# Patient Record
Sex: MALE | Race: WHITE | NOT HISPANIC OR LATINO | Employment: OTHER | URBAN - NONMETROPOLITAN AREA
[De-identification: names, ages, dates, MRNs, and addresses within clinical notes are randomized per-mention and may not be internally consistent; named-entity substitution may affect disease eponyms.]

---

## 2023-06-05 NOTE — PROGRESS NOTES
Patient ID:   Buzz Suh is a 52 y.o. male with PMH remarkable for gout (one flare up in the past), tobacco dependence who presents to the office today for Establish Care.    HEALTH MAINTENANCE: Establish Care  Previous PCP: Dr Hunt  Smoking: Current Smoker - 1 ppd x30 yrs  -- quit ~6m ago for a few months but had zero ambition so he started back up.  Labs: 2022 -> DUE  Creat was 1.43, , ETOH 146 at that time.   PSA: DUE  #father h/o prostate cancer  Colonoscopy (45-75): Never had one -> will refer to general surgery for screening colonoscopy  Lung cancer (55-80 + 30 pack year + smoking/quit in last 15 years): 3/7/2023 CT cardiac scoring was 0. No nodules were noted.   EK2023  -- family has h/o MI's (mother, father and grandmother). Has 2 older brothers.   -- snores at night. Never had a sleep study. Always snored since age 11.   -- reports that when he has too much sugar, his vision becomes blurry  -- reports that he has allergies. Takes zyrtec and flonase usually.   -- urinates frequently, has edema in BLE x1 when he ws dx with gout, and c/o acid reflux on/off.   - murmur heard on exam today. Due to family history of CAD, advised to let us know and we can get further workup (echo, stress test).    SOCIAL HISTORY:  Social History     Tobacco Use    Smoking status: Every Day     Types: Cigarettes    Smokeless tobacco: Never   Substance Use Topics    Alcohol use: Yes    Drug use: Never     REVIEW OF SYSTEMS:  Review of Systems   Genitourinary:  Positive for frequency.   All other systems reviewed and are negative.  12 point review of systems negative unless stated above in HPI    ALLERGIES:  No Known Allergies     VITAL SIGNS:  Vitals:    23 1008   BP: 134/84   Pulse: 60   Resp: 18   SpO2: 95%     PHYSICAL EXAM:  Physical Exam  Vitals reviewed.   Constitutional:       General: He is not in acute distress.     Appearance: Normal appearance. He is not ill-appearing.   HENT:      Head:  Normocephalic and atraumatic.      Right Ear: Tympanic membrane and external ear normal.      Left Ear: Tympanic membrane and external ear normal.      Nose: Nose normal.      Mouth/Throat:      Mouth: Mucous membranes are moist.      Pharynx: Oropharynx is clear.   Eyes:      Conjunctiva/sclera: Conjunctivae normal.      Pupils: Pupils are equal, round, and reactive to light.   Cardiovascular:      Rate and Rhythm: Normal rate and regular rhythm.      Heart sounds: Murmur heard.   Pulmonary:      Effort: Pulmonary effort is normal. No respiratory distress.      Breath sounds: Normal breath sounds. No wheezing.   Abdominal:      General: There is no distension.      Palpations: Abdomen is soft. There is no mass.      Tenderness: There is no abdominal tenderness.   Musculoskeletal:         General: Normal range of motion.      Cervical back: Normal range of motion and neck supple.   Skin:     General: Skin is warm and dry.   Neurological:      General: No focal deficit present.      Mental Status: He is alert and oriented to person, place, and time.      Sensory: No sensory deficit.      Motor: No weakness.      Coordination: Coordination normal.      Gait: Gait normal.   Psychiatric:         Mood and Affect: Mood normal.         Behavior: Behavior normal.       MEDICATIONS:  Current Outpatient Medications on File Prior to Visit   Medication Sig Dispense Refill    fluticasone (Flonase) 50 mcg/actuation nasal spray Administer 1 spray into each nostril once daily. Shake gently. Before first use, prime pump. After use, clean tip and replace cap.       No current facility-administered medications on file prior to visit.      LABORATORY DATA:  Lab Results   Component Value Date    WBC 6.2 04/05/2022    HGB 13.7 04/05/2022    HCT 39.9 (L) 04/05/2022     04/05/2022    ALT 52 04/05/2022    AST 37 04/05/2022     04/05/2022    K 3.5 04/05/2022    CL 99 04/05/2022    CREATININE 1.43 (H) 04/05/2022    BUN 16  04/05/2022    CO2 26 04/05/2022    INR 1.0 04/05/2022     ASSESSMENT AND PLAN:  Assessment/Plan   Diagnoses and all orders for this visit:  Health care maintenance  -     CBC and Auto Differential; Future  -     Comprehensive Metabolic Panel; Future  -     TSH with reflex to Free T4 if abnormal; Future  -     Vitamin D, Total; Future  -     Vitamin B12; Future  Lipid screening  -     Lipid Panel; Future  Screening PSA (prostate specific antigen)  -     Prostate Spec.Ag,Screen; Future  Cardiac murmur  Comments:  - let us know if you develop SOB, NOYOLA, edema, etc and we can order an echocardiogram  Encounter for screening colonoscopy  -     Referral to General Surgery; Future  Polyuria  -     Hemoglobin A1c; Future    --------------------  Written by Pattie Lira RN, acting as a scribe for Dr. Andrade. This note accurately reflects the work and decisions made by Dr. Andrade.     I, Dr. Andrade, attest all medical record entries made by the scribe were under my direction and were personally dictated by me. I have reviewed the chart and agree that the record accurately reflects my performance of the history, physical exam, and assessment and plan.

## 2023-06-06 ENCOUNTER — OFFICE VISIT (OUTPATIENT)
Dept: PRIMARY CARE | Facility: CLINIC | Age: 53
End: 2023-06-06
Payer: COMMERCIAL

## 2023-06-06 VITALS
RESPIRATION RATE: 18 BRPM | DIASTOLIC BLOOD PRESSURE: 84 MMHG | SYSTOLIC BLOOD PRESSURE: 134 MMHG | HEIGHT: 75 IN | HEART RATE: 60 BPM | WEIGHT: 265 LBS | OXYGEN SATURATION: 95 % | BODY MASS INDEX: 32.95 KG/M2

## 2023-06-06 DIAGNOSIS — Z13.220 LIPID SCREENING: ICD-10-CM

## 2023-06-06 DIAGNOSIS — Z12.11 ENCOUNTER FOR SCREENING COLONOSCOPY: ICD-10-CM

## 2023-06-06 DIAGNOSIS — Z00.00 HEALTH CARE MAINTENANCE: ICD-10-CM

## 2023-06-06 DIAGNOSIS — R35.89 POLYURIA: ICD-10-CM

## 2023-06-06 DIAGNOSIS — R01.1 CARDIAC MURMUR: ICD-10-CM

## 2023-06-06 DIAGNOSIS — Z12.5 SCREENING PSA (PROSTATE SPECIFIC ANTIGEN): ICD-10-CM

## 2023-06-06 PROBLEM — M77.8 TRICEPS TENDINITIS: Status: RESOLVED | Noted: 2023-06-06 | Resolved: 2023-06-06

## 2023-06-06 PROBLEM — S52.123A RADIAL HEAD FRACTURE: Status: RESOLVED | Noted: 2023-06-06 | Resolved: 2023-06-06

## 2023-06-06 PROBLEM — J42 ACUTE EXACERBATION OF CHRONIC BRONCHITIS (MULTI): Status: RESOLVED | Noted: 2023-06-06 | Resolved: 2023-06-06

## 2023-06-06 PROBLEM — M19.071 ARTHRITIS OF RIGHT ANKLE: Status: RESOLVED | Noted: 2023-06-06 | Resolved: 2023-06-06

## 2023-06-06 PROBLEM — M70.22 OLECRANON BURSITIS OF LEFT ELBOW: Status: RESOLVED | Noted: 2023-06-06 | Resolved: 2023-06-06

## 2023-06-06 PROBLEM — T14.8XXA PUNCTURE WOUND: Status: RESOLVED | Noted: 2023-06-06 | Resolved: 2023-06-06

## 2023-06-06 PROBLEM — S46.311A STRAIN OF RIGHT TRICEPS: Status: RESOLVED | Noted: 2023-06-06 | Resolved: 2023-06-06

## 2023-06-06 PROBLEM — J30.2 SEASONAL ALLERGIC RHINITIS: Status: ACTIVE | Noted: 2023-06-06

## 2023-06-06 PROBLEM — M25.571 RIGHT ANKLE PAIN: Status: RESOLVED | Noted: 2023-06-06 | Resolved: 2023-06-06

## 2023-06-06 PROBLEM — J20.9 ACUTE EXACERBATION OF CHRONIC BRONCHITIS (MULTI): Status: RESOLVED | Noted: 2023-06-06 | Resolved: 2023-06-06

## 2023-06-06 PROBLEM — M10.9 ACUTE GOUT OF RIGHT ANKLE: Status: RESOLVED | Noted: 2023-06-06 | Resolved: 2023-06-06

## 2023-06-06 PROBLEM — T81.9XXA POST SURGICAL COMPLICATION: Status: RESOLVED | Noted: 2023-06-06 | Resolved: 2023-06-06

## 2023-06-06 PROBLEM — M25.529 ELBOW PAIN: Status: RESOLVED | Noted: 2023-06-06 | Resolved: 2023-06-06

## 2023-06-06 PROBLEM — S99.929A FOOT INJURY: Status: RESOLVED | Noted: 2023-06-06 | Resolved: 2023-06-06

## 2023-06-06 PROBLEM — S99.919A ANKLE INJURY: Status: RESOLVED | Noted: 2023-06-06 | Resolved: 2023-06-06

## 2023-06-06 PROBLEM — J20.9 ACUTE BRONCHITIS: Status: RESOLVED | Noted: 2023-06-06 | Resolved: 2023-06-06

## 2023-06-06 PROBLEM — J01.00 ACUTE MAXILLARY SINUSITIS: Status: RESOLVED | Noted: 2023-06-06 | Resolved: 2023-06-06

## 2023-06-06 PROBLEM — R42 LIGHTHEADEDNESS: Status: RESOLVED | Noted: 2023-06-06 | Resolved: 2023-06-06

## 2023-06-06 PROCEDURE — 99396 PREV VISIT EST AGE 40-64: CPT | Performed by: INTERNAL MEDICINE

## 2023-06-06 RX ORDER — FLUTICASONE PROPIONATE 50 MCG
1 SPRAY, SUSPENSION (ML) NASAL DAILY
COMMUNITY

## 2023-06-06 ASSESSMENT — PATIENT HEALTH QUESTIONNAIRE - PHQ9
1. LITTLE INTEREST OR PLEASURE IN DOING THINGS: NOT AT ALL
2. FEELING DOWN, DEPRESSED OR HOPELESS: NOT AT ALL
SUM OF ALL RESPONSES TO PHQ9 QUESTIONS 1 AND 2: 0

## 2023-06-06 ASSESSMENT — ENCOUNTER SYMPTOMS: FREQUENCY: 1

## 2023-06-06 ASSESSMENT — PAIN SCALES - GENERAL: PAINLEVEL: 0-NO PAIN

## 2023-06-13 ENCOUNTER — LAB (OUTPATIENT)
Dept: LAB | Facility: LAB | Age: 53
End: 2023-06-13
Payer: COMMERCIAL

## 2023-06-13 DIAGNOSIS — Z12.5 SCREENING PSA (PROSTATE SPECIFIC ANTIGEN): ICD-10-CM

## 2023-06-13 DIAGNOSIS — R35.89 POLYURIA: ICD-10-CM

## 2023-06-13 DIAGNOSIS — Z13.220 LIPID SCREENING: ICD-10-CM

## 2023-06-13 DIAGNOSIS — Z00.00 HEALTH CARE MAINTENANCE: ICD-10-CM

## 2023-06-13 LAB
ALANINE AMINOTRANSFERASE (SGPT) (U/L) IN SER/PLAS: 25 U/L (ref 10–52)
ALBUMIN (G/DL) IN SER/PLAS: 4.5 G/DL (ref 3.4–5)
ALKALINE PHOSPHATASE (U/L) IN SER/PLAS: 67 U/L (ref 33–120)
ANION GAP IN SER/PLAS: 12 MMOL/L (ref 10–20)
ASPARTATE AMINOTRANSFERASE (SGOT) (U/L) IN SER/PLAS: 19 U/L (ref 9–39)
BASOPHILS (10*3/UL) IN BLOOD BY AUTOMATED COUNT: 0.05 X10E9/L (ref 0–0.1)
BASOPHILS/100 LEUKOCYTES IN BLOOD BY AUTOMATED COUNT: 0.9 % (ref 0–2)
BILIRUBIN TOTAL (MG/DL) IN SER/PLAS: 0.5 MG/DL (ref 0–1.2)
CALCIDIOL (25 OH VITAMIN D3) (NG/ML) IN SER/PLAS: 25 NG/ML
CALCIUM (MG/DL) IN SER/PLAS: 9.6 MG/DL (ref 8.6–10.3)
CARBON DIOXIDE, TOTAL (MMOL/L) IN SER/PLAS: 25 MMOL/L (ref 21–32)
CHLORIDE (MMOL/L) IN SER/PLAS: 102 MMOL/L (ref 98–107)
CHOLESTEROL (MG/DL) IN SER/PLAS: 213 MG/DL (ref 0–199)
CHOLESTEROL IN HDL (MG/DL) IN SER/PLAS: 34.1 MG/DL
CHOLESTEROL/HDL RATIO: 6.2
COBALAMIN (VITAMIN B12) (PG/ML) IN SER/PLAS: 368 PG/ML (ref 211–911)
CREATININE (MG/DL) IN SER/PLAS: 1.2 MG/DL (ref 0.5–1.3)
EOSINOPHILS (10*3/UL) IN BLOOD BY AUTOMATED COUNT: 0.13 X10E9/L (ref 0–0.7)
EOSINOPHILS/100 LEUKOCYTES IN BLOOD BY AUTOMATED COUNT: 2.4 % (ref 0–6)
ERYTHROCYTE DISTRIBUTION WIDTH (RATIO) BY AUTOMATED COUNT: 12.7 % (ref 11.5–14.5)
ERYTHROCYTE MEAN CORPUSCULAR HEMOGLOBIN CONCENTRATION (G/DL) BY AUTOMATED: 33 G/DL (ref 32–36)
ERYTHROCYTE MEAN CORPUSCULAR VOLUME (FL) BY AUTOMATED COUNT: 96 FL (ref 80–100)
ERYTHROCYTES (10*6/UL) IN BLOOD BY AUTOMATED COUNT: 4.75 X10E12/L (ref 4.5–5.9)
ESTIMATED AVERAGE GLUCOSE FOR HBA1C: 126 MG/DL
GFR MALE: 73 ML/MIN/1.73M2
GLUCOSE (MG/DL) IN SER/PLAS: 108 MG/DL (ref 74–99)
HEMATOCRIT (%) IN BLOOD BY AUTOMATED COUNT: 45.5 % (ref 41–52)
HEMOGLOBIN (G/DL) IN BLOOD: 15 G/DL (ref 13.5–17.5)
HEMOGLOBIN A1C/HEMOGLOBIN TOTAL IN BLOOD: 6 %
IMMATURE GRANULOCYTES/100 LEUKOCYTES IN BLOOD BY AUTOMATED COUNT: 0.7 % (ref 0–0.9)
LDL: 121 MG/DL (ref 0–99)
LEUKOCYTES (10*3/UL) IN BLOOD BY AUTOMATED COUNT: 5.5 X10E9/L (ref 4.4–11.3)
LYMPHOCYTES (10*3/UL) IN BLOOD BY AUTOMATED COUNT: 1.7 X10E9/L (ref 1.2–4.8)
LYMPHOCYTES/100 LEUKOCYTES IN BLOOD BY AUTOMATED COUNT: 31 % (ref 13–44)
MONOCYTES (10*3/UL) IN BLOOD BY AUTOMATED COUNT: 0.41 X10E9/L (ref 0.1–1)
MONOCYTES/100 LEUKOCYTES IN BLOOD BY AUTOMATED COUNT: 7.5 % (ref 2–10)
NEUTROPHILS (10*3/UL) IN BLOOD BY AUTOMATED COUNT: 3.15 X10E9/L (ref 1.2–7.7)
NEUTROPHILS/100 LEUKOCYTES IN BLOOD BY AUTOMATED COUNT: 57.5 % (ref 40–80)
NON HDL CHOLESTEROL: 179 MG/DL
PLATELETS (10*3/UL) IN BLOOD AUTOMATED COUNT: 215 X10E9/L (ref 150–450)
POTASSIUM (MMOL/L) IN SER/PLAS: 4.1 MMOL/L (ref 3.5–5.3)
PROSTATE SPECIFIC ANTIGEN,SCREEN: 0.47 NG/ML (ref 0–4)
PROTEIN TOTAL: 6.6 G/DL (ref 6.4–8.2)
SODIUM (MMOL/L) IN SER/PLAS: 135 MMOL/L (ref 136–145)
THYROTROPIN (MIU/L) IN SER/PLAS BY DETECTION LIMIT <= 0.05 MIU/L: 6.12 MIU/L (ref 0.44–3.98)
THYROXINE (T4) FREE (NG/DL) IN SER/PLAS: 0.66 NG/DL (ref 0.61–1.12)
TRIGLYCERIDE (MG/DL) IN SER/PLAS: 291 MG/DL (ref 0–149)
UREA NITROGEN (MG/DL) IN SER/PLAS: 23 MG/DL (ref 6–23)
VLDL: 58 MG/DL (ref 0–40)

## 2023-06-13 PROCEDURE — 82306 VITAMIN D 25 HYDROXY: CPT

## 2023-06-13 PROCEDURE — 80053 COMPREHEN METABOLIC PANEL: CPT

## 2023-06-13 PROCEDURE — 84153 ASSAY OF PSA TOTAL: CPT

## 2023-06-13 PROCEDURE — 80061 LIPID PANEL: CPT

## 2023-06-13 PROCEDURE — 84439 ASSAY OF FREE THYROXINE: CPT

## 2023-06-13 PROCEDURE — 84443 ASSAY THYROID STIM HORMONE: CPT

## 2023-06-13 PROCEDURE — 83036 HEMOGLOBIN GLYCOSYLATED A1C: CPT

## 2023-06-13 PROCEDURE — 82607 VITAMIN B-12: CPT

## 2023-06-13 PROCEDURE — 36415 COLL VENOUS BLD VENIPUNCTURE: CPT

## 2023-06-13 PROCEDURE — 85025 COMPLETE CBC W/AUTO DIFF WBC: CPT

## 2023-06-19 DIAGNOSIS — E78.00 HYPERCHOLESTEREMIA: ICD-10-CM

## 2023-06-19 DIAGNOSIS — E03.9 HYPOTHYROIDISM, UNSPECIFIED TYPE: ICD-10-CM

## 2023-06-19 RX ORDER — ROSUVASTATIN CALCIUM 10 MG/1
10 TABLET, COATED ORAL DAILY
Qty: 90 TABLET | Refills: 0 | Status: SHIPPED | OUTPATIENT
Start: 2023-06-19 | End: 2023-09-15

## 2023-06-19 RX ORDER — LEVOTHYROXINE SODIUM 25 UG/1
25 TABLET ORAL DAILY
Qty: 90 TABLET | Refills: 0 | Status: SHIPPED | OUTPATIENT
Start: 2023-06-19 | End: 2023-09-15

## 2023-09-15 DIAGNOSIS — E03.9 HYPOTHYROIDISM, UNSPECIFIED TYPE: ICD-10-CM

## 2023-09-15 DIAGNOSIS — E78.00 HYPERCHOLESTEREMIA: ICD-10-CM

## 2023-09-15 RX ORDER — LEVOTHYROXINE SODIUM 25 UG/1
25 TABLET ORAL DAILY
Qty: 90 TABLET | Refills: 0 | Status: SHIPPED | OUTPATIENT
Start: 2023-09-15

## 2023-09-15 RX ORDER — ROSUVASTATIN CALCIUM 10 MG/1
10 TABLET, COATED ORAL DAILY
Qty: 90 TABLET | Refills: 0 | Status: SHIPPED | OUTPATIENT
Start: 2023-09-15

## 2023-09-22 ENCOUNTER — APPOINTMENT (OUTPATIENT)
Dept: PRIMARY CARE | Facility: CLINIC | Age: 53
End: 2023-09-22
Payer: COMMERCIAL

## 2023-11-03 ENCOUNTER — APPOINTMENT (OUTPATIENT)
Dept: PRIMARY CARE | Facility: CLINIC | Age: 53
End: 2023-11-03
Payer: COMMERCIAL

## 2023-11-22 ENCOUNTER — CLINICAL SUPPORT (OUTPATIENT)
Dept: PREADMISSION TESTING | Facility: HOSPITAL | Age: 53
End: 2023-11-22
Payer: COMMERCIAL

## 2023-11-22 ASSESSMENT — DUKE ACTIVITY SCORE INDEX (DASI)
CAN YOU CLIMB A FLIGHT OF STAIRS OR WALK UP A HILL: YES
CAN YOU TAKE CARE OF YOURSELF (EAT, DRESS, BATHE, OR USE TOILET): YES
CAN YOU DO YARD WORK LIKE RAKING LEAVES, WEEDING OR PUSHING A MOWER: YES
CAN YOU DO MODERATE WORK AROUND THE HOUSE LIKE VACUUMING, SWEEPING FLOORS OR CARRYING GROCERIES: YES
CAN YOU WALK INDOORS, SUCH AS AROUND YOUR HOUSE: YES
CAN YOU PARTICIPATE IN MODERATE RECREATIONAL ACTIVITIES LIKE GOLF, BOWLING, DANCING, DOUBLES TENNIS OR THROWING A BASEBALL OR FOOTBALL: YES
CAN YOU DO LIGHT WORK AROUND THE HOUSE LIKE DUSTING OR WASHING DISHES: YES
CAN YOU WALK A BLOCK OR TWO ON LEVEL GROUND: YES
CAN YOU DO HEAVY WORK AROUND THE HOUSE LIKE SCRUBBING FLOORS OR LIFTING AND MOVING HEAVY FURNITURE: YES
CAN YOU RUN A SHORT DISTANCE: YES

## 2023-11-22 NOTE — PREPROCEDURE INSTRUCTIONS
Current Medications   Medication Instructions    fluticasone (Flonase) 50 mcg/actuation nasal spray You may take the am of the procedure should you need it.      levothyroxine (Synthroid, Levoxyl) 25 mcg tablet Take the morning of with a small sip of water.      rosuvastatin (Crestor) 10 mg tablet Take the night before like you normally do with a small sip of water.      Call outpatient surgery on Wednesday 11/29 between 1-3 pm to get your arrival time.   [phone]  You  may eat 1 hard boiled or poached egg and a piece of dry toast by 8 am on Wed 11/29.  After you are switching to clear liquids.    Clear liquids are the following:  Chicken broth- nothing in it noodle wise or rice wise (plain)  Popsicles: plain no fruit or ice cream.   NO red blue or purple.   Jello: plain no fruit or whip topping  NO red blue or purple.   Propel, gatorade, powerade NO red blue or purple  Coffee or tea  Has to be black.  NO DAIRY NON DAIRY OAT , ALMOND ETC.    Water or soda     No candy, gum or mints once you switch to clear liquids.       You can have clear liquids up to 3 hrs prior to your procedure.  NO DAIRY, NO CREAMER, NO NON DAIRY OR ALMOND, OAT, COCONUT ETC.    Please refrain from smoking THC, cigarettes or vaping prior to your procedure at least 24 hrs.     When  you arrive park in the back ER parking lot.  Come through the ER lobby and take the first elevator to second floor.   Check in on the outpatient surgery window.    Leave all valuables at home and remove all piercing's.      Do mouth wash and bath for infection control if applicable.      NO driving for 24 hrs if you have had anesthesia or sedation.   You will also need a  if you are receiving sedation.       Bring glasses so you can read what you are signing.       PREP SUTAB     Start your pills at 6 pm on Wed 11/29.       STEP 1 Open 1 bottle of 12 tablets.    STEP 2 Fill the provided container with 16 ounces of  water (up to the fill line). Swallow 1  tablet every 1 to 2  minutes. You should finish the 12 tablets and the  entire 16 ounces of water within 20 minutes.  Drink the additional two 16 ounces of water    STEP 3 Approximately 1 hour after the last tablet  is ingested, fill the provided container again with 16  ounces of water (up to the fill line), and drink the entire  amount over 30 minutes.    STEP 4 Approximately 30 minutes after finishing the  second container of water, fill the provided container with  16 ounces of water (up to the fill line), and drink the entire  amount over 30 minutes.    Try and go to sleep.  Then next morning at 3:00 am. On 11/30/23  Set alarm and repeat the directions with second round of 12 pills.

## 2023-11-30 ENCOUNTER — ANESTHESIA EVENT (OUTPATIENT)
Dept: GASTROENTEROLOGY | Facility: HOSPITAL | Age: 53
End: 2023-11-30
Payer: COMMERCIAL

## 2023-11-30 ENCOUNTER — ANESTHESIA (OUTPATIENT)
Dept: GASTROENTEROLOGY | Facility: HOSPITAL | Age: 53
End: 2023-11-30
Payer: COMMERCIAL

## 2023-11-30 ENCOUNTER — HOSPITAL ENCOUNTER (OUTPATIENT)
Dept: GASTROENTEROLOGY | Facility: HOSPITAL | Age: 53
Setting detail: OUTPATIENT SURGERY
Discharge: HOME | End: 2023-11-30
Payer: COMMERCIAL

## 2023-11-30 VITALS
WEIGHT: 271.17 LBS | BODY MASS INDEX: 34.8 KG/M2 | TEMPERATURE: 97 F | DIASTOLIC BLOOD PRESSURE: 87 MMHG | RESPIRATION RATE: 18 BRPM | HEART RATE: 64 BPM | SYSTOLIC BLOOD PRESSURE: 130 MMHG | OXYGEN SATURATION: 98 % | HEIGHT: 74 IN

## 2023-11-30 DIAGNOSIS — G47.33 OSA (OBSTRUCTIVE SLEEP APNEA): ICD-10-CM

## 2023-11-30 DIAGNOSIS — Z12.11 ENCOUNTER FOR SCREENING COLONOSCOPY: ICD-10-CM

## 2023-11-30 DIAGNOSIS — Z12.11 ENCOUNTER FOR SCREENING FOR MALIGNANT NEOPLASM OF COLON: ICD-10-CM

## 2023-11-30 PROBLEM — F17.200 CURRENT SMOKER: Status: ACTIVE | Noted: 2023-11-30

## 2023-11-30 PROBLEM — E03.9 HYPOTHYROIDISM: Status: ACTIVE | Noted: 2023-11-30

## 2023-11-30 PROBLEM — J30.9 ALLERGIC RHINITIS: Status: ACTIVE | Noted: 2023-11-30

## 2023-11-30 PROBLEM — E78.5 HYPERLIPIDEMIA: Status: ACTIVE | Noted: 2023-11-30

## 2023-11-30 PROCEDURE — 2500000004 HC RX 250 GENERAL PHARMACY W/ HCPCS (ALT 636 FOR OP/ED)

## 2023-11-30 PROCEDURE — 88305 TISSUE EXAM BY PATHOLOGIST: CPT | Mod: TC,SUR | Performed by: SURGERY

## 2023-11-30 PROCEDURE — 3700000001 HC GENERAL ANESTHESIA TIME - INITIAL BASE CHARGE

## 2023-11-30 PROCEDURE — 7100000010 HC PHASE TWO TIME - EACH INCREMENTAL 1 MINUTE

## 2023-11-30 PROCEDURE — 7100000009 HC PHASE TWO TIME - INITIAL BASE CHARGE

## 2023-11-30 PROCEDURE — 88305 TISSUE EXAM BY PATHOLOGIST: CPT | Performed by: PATHOLOGY

## 2023-11-30 PROCEDURE — 3700000002 HC GENERAL ANESTHESIA TIME - EACH INCREMENTAL 1 MINUTE

## 2023-11-30 PROCEDURE — 45385 COLONOSCOPY W/LESION REMOVAL: CPT | Performed by: SURGERY

## 2023-11-30 PROCEDURE — 2500000005 HC RX 250 GENERAL PHARMACY W/O HCPCS

## 2023-11-30 RX ORDER — PROPOFOL 10 MG/ML
INJECTION, EMULSION INTRAVENOUS AS NEEDED
Status: DISCONTINUED | OUTPATIENT
Start: 2023-11-30 | End: 2023-11-30

## 2023-11-30 RX ORDER — LIDOCAINE HYDROCHLORIDE 20 MG/ML
INJECTION, SOLUTION INFILTRATION; PERINEURAL AS NEEDED
Status: DISCONTINUED | OUTPATIENT
Start: 2023-11-30 | End: 2023-11-30

## 2023-11-30 RX ORDER — SODIUM CHLORIDE, SODIUM LACTATE, POTASSIUM CHLORIDE, CALCIUM CHLORIDE 600; 310; 30; 20 MG/100ML; MG/100ML; MG/100ML; MG/100ML
30 INJECTION, SOLUTION INTRAVENOUS CONTINUOUS
Status: DISCONTINUED | OUTPATIENT
Start: 2023-11-30 | End: 2023-12-01 | Stop reason: HOSPADM

## 2023-11-30 RX ADMIN — LIDOCAINE HYDROCHLORIDE 40 MG: 20 INJECTION, SOLUTION INFILTRATION; PERINEURAL at 12:23

## 2023-11-30 RX ADMIN — PROPOFOL 25 MG: 10 INJECTION, EMULSION INTRAVENOUS at 12:25

## 2023-11-30 RX ADMIN — PROPOFOL 25 MG: 10 INJECTION, EMULSION INTRAVENOUS at 12:36

## 2023-11-30 RX ADMIN — PROPOFOL 25 MG: 10 INJECTION, EMULSION INTRAVENOUS at 12:32

## 2023-11-30 RX ADMIN — PROPOFOL 30 MG: 10 INJECTION, EMULSION INTRAVENOUS at 12:26

## 2023-11-30 RX ADMIN — SODIUM CHLORIDE, POTASSIUM CHLORIDE, SODIUM LACTATE AND CALCIUM CHLORIDE 30 ML/HR: 600; 310; 30; 20 INJECTION, SOLUTION INTRAVENOUS at 11:07

## 2023-11-30 RX ADMIN — PROPOFOL 25 MG: 10 INJECTION, EMULSION INTRAVENOUS at 12:34

## 2023-11-30 RX ADMIN — PROPOFOL 25 MG: 10 INJECTION, EMULSION INTRAVENOUS at 12:28

## 2023-11-30 RX ADMIN — PROPOFOL 25 MG: 10 INJECTION, EMULSION INTRAVENOUS at 12:30

## 2023-11-30 RX ADMIN — PROPOFOL 120 MG: 10 INJECTION, EMULSION INTRAVENOUS at 12:23

## 2023-11-30 SDOH — HEALTH STABILITY: MENTAL HEALTH: CURRENT SMOKER: 0

## 2023-11-30 ASSESSMENT — PAIN - FUNCTIONAL ASSESSMENT
PAIN_FUNCTIONAL_ASSESSMENT: 0-10

## 2023-11-30 ASSESSMENT — COLUMBIA-SUICIDE SEVERITY RATING SCALE - C-SSRS
1. IN THE PAST MONTH, HAVE YOU WISHED YOU WERE DEAD OR WISHED YOU COULD GO TO SLEEP AND NOT WAKE UP?: NO
2. HAVE YOU ACTUALLY HAD ANY THOUGHTS OF KILLING YOURSELF?: NO
6. HAVE YOU EVER DONE ANYTHING, STARTED TO DO ANYTHING, OR PREPARED TO DO ANYTHING TO END YOUR LIFE?: NO

## 2023-11-30 ASSESSMENT — PAIN SCALES - GENERAL
PAINLEVEL_OUTOF10: 0 - NO PAIN
PAIN_LEVEL: 0
PAINLEVEL_OUTOF10: 0 - NO PAIN

## 2023-11-30 NOTE — POST-PROCEDURE NOTE
1253: Pt tolerating PO fluids. Wife at bedside Dr. Funk in to speak to pt and spouse.  1255: Discharge instructions reviewed. They deny needs or questions. Physical assessment unchanged.

## 2023-11-30 NOTE — ANESTHESIA PREPROCEDURE EVALUATION
Patient: Buzz Suh    Procedure Information       Date/Time: 11/30/23 1145    Scheduled providers: Brendon Funk MD    Procedures:       AMB REFERRAL TO GENERAL SURGERY      COLONOSCOPY    Location: [facility]          Vitals:    11/30/23 1101   BP: 132/78   Pulse: 59   Resp: 17   Temp: 36.4 °C (97.5 °F)   SpO2: 96%       Past Surgical History:   Procedure Laterality Date    ELBOW SURGERY  07/30/2018    Elbow Surgery    KNEE SURGERY  07/30/2018    Knee Surgery    OTHER SURGICAL HISTORY  07/30/2018    Elbow Arthroplasty Of Radial Head     Past Medical History:   Diagnosis Date    Acute bronchitis 06/06/2023    Acute gout of right ankle 06/06/2023    Acute maxillary sinusitis 06/06/2023    Ankle injury 06/06/2023    Arthritis of right ankle 06/06/2023    Elbow pain 06/06/2023    Foot injury 06/06/2023    Hypothyroidism     Olecranon bursitis of left elbow 06/06/2023    Post surgical complication 06/06/2023    Puncture wound 06/06/2023    Radial head fracture 06/06/2023    Right ankle pain 06/06/2023    Strain of right triceps 06/06/2023    Triceps tendinitis 06/06/2023       Current Outpatient Medications:     fluticasone (Flonase) 50 mcg/actuation nasal spray, Administer 1 spray into each nostril once daily. Shake gently. Before first use, prime pump. After use, clean tip and replace cap., Disp: , Rfl:     levothyroxine (Synthroid, Levoxyl) 25 mcg tablet, TAKE 1 TABLET (25 MCG) BY MOUTH DAILY, Disp: 90 tablet, Rfl: 0    rosuvastatin (Crestor) 10 mg tablet, TAKE 1 TABLET BY MOUTH EVERY DAY, Disp: 90 tablet, Rfl: 0    Current Facility-Administered Medications:     lactated Ringer's infusion, 30 mL/hr, intravenous, Continuous, Lety Urbano PA-C  Prior to Admission medications    Medication Sig Start Date End Date Taking? Authorizing Provider   fluticasone (Flonase) 50 mcg/actuation nasal spray Administer 1 spray into each nostril once daily. Shake gently. Before first use, prime pump. After use,  clean tip and replace cap.   Yes Historical Provider, MD   levothyroxine (Synthroid, Levoxyl) 25 mcg tablet TAKE 1 TABLET (25 MCG) BY MOUTH DAILY 9/15/23  Yes Adrian Galdamez MD   rosuvastatin (Crestor) 10 mg tablet TAKE 1 TABLET BY MOUTH EVERY DAY 9/15/23  Yes Adrian Galdamez MD     No Known Allergies  Social History     Tobacco Use    Smoking status: Every Day     Packs/day: .25     Types: Cigarettes    Smokeless tobacco: Never   Substance Use Topics    Alcohol use: Yes         Chemistry    Lab Results   Component Value Date/Time     (L) 06/13/2023 0757    K 4.1 06/13/2023 0757     06/13/2023 0757    CO2 25 06/13/2023 0757    BUN 23 06/13/2023 0757    CREATININE 1.20 06/13/2023 0757    Lab Results   Component Value Date/Time    CALCIUM 9.6 06/13/2023 0757    ALKPHOS 67 06/13/2023 0757    AST 19 06/13/2023 0757    ALT 25 06/13/2023 0757    BILITOT 0.5 06/13/2023 0757          Lab Results   Component Value Date/Time    WBC 5.5 06/13/2023 0757    HGB 15.0 06/13/2023 0757    HCT 45.5 06/13/2023 0757     06/13/2023 0757     Lab Results   Component Value Date/Time    PROTIME 11.0 04/05/2022 2042    INR 1.0 04/05/2022 2042     No results found for this or any previous visit (from the past 4464 hour(s)).  No results found for this or any previous visit from the past 1095 days.      Relevant Problems   Anesthesia (within normal limits)      Cardiovascular   (+) Cardiac murmur   (+) Hyperlipidemia      Endocrine   (+) Hypothyroidism      Tobacco   (+) Current smoker       Clinical information reviewed:   Tobacco  Allergies  Meds   Med Hx  Surg Hx   Fam Hx  Soc Hx        NPO Detail:  NPO/Void Status  Carbonhydrate Drink Given Prior to Surgery? : N  Date of Last Liquid: 11/30/23  Time of Last Liquid: 0500  Date of Last Solid: 11/29/23  Time of Last Solid: 0800  Last Intake Type: Clear fluids         Physical Exam    Airway  Mallampati: III  TM distance: >3 FB  Neck ROM: full     Cardiovascular -  normal exam     Dental - normal exam     Pulmonary - normal exam     Abdominal - normal exam             Anesthesia Plan    ASA 2     MAC     The patient is not a current smoker.  Patient was previously instructed to abstain from smoking on day of procedure.  Patient did not smoke on day of procedure.    intravenous induction   Anesthetic plan and risks discussed with patient.  Use of blood products discussed with patient who consented to blood products.    Plan discussed with CRNA.

## 2023-11-30 NOTE — ANESTHESIA POSTPROCEDURE EVALUATION
Patient: Buzz Suh    Procedure Summary       Date: 11/30/23 Room / Location: [facility]    Anesthesia Start: 1216 Anesthesia Stop: 1246    Procedures:       AMB REFERRAL TO GENERAL SURGERY      COLONOSCOPY Diagnosis:       Encounter for screening colonoscopy      Encounter for screening for malignant neoplasm of colon    Scheduled Providers: Brendon Funk MD Responsible Provider: CAROLINE West    Anesthesia Type: MAC ASA Status: 2            Anesthesia Type: MAC    Vitals Value Taken Time   /75 11/30/23 1242   Temp 36.1 °C (97 °F) 11/30/23 1242   Pulse 65 11/30/23 1242   Resp 18 11/30/23 1242   SpO2 97 % 11/30/23 1242       Anesthesia Post Evaluation    Patient location during evaluation: PACU  Patient participation: complete - patient participated  Level of consciousness: awake and alert  Pain score: 0  Pain management: adequate  Airway patency: patent  Cardiovascular status: acceptable  Respiratory status: acceptable and room air  Hydration status: acceptable  Postoperative Nausea and Vomiting: none        There were no known notable events for this encounter.

## 2023-11-30 NOTE — H&P
History Of Present Illness  Buzz Suh is a 53 y.o. male presenting for low risk colonoscopy         Past Medical History  Past Medical History:   Diagnosis Date    Acute bronchitis 06/06/2023    Acute gout of right ankle 06/06/2023    Acute maxillary sinusitis 06/06/2023    Ankle injury 06/06/2023    Arthritis of right ankle 06/06/2023    Elbow pain 06/06/2023    Foot injury 06/06/2023    Hypothyroidism     Olecranon bursitis of left elbow 06/06/2023    Post surgical complication 06/06/2023    Puncture wound 06/06/2023    Radial head fracture 06/06/2023    Right ankle pain 06/06/2023    Strain of right triceps 06/06/2023    Triceps tendinitis 06/06/2023       Surgical History  Past Surgical History:   Procedure Laterality Date    ELBOW SURGERY  07/30/2018    Elbow Surgery    KNEE SURGERY  07/30/2018    Knee Surgery    OTHER SURGICAL HISTORY  07/30/2018    Elbow Arthroplasty Of Radial Head        Social History  He reports that he has been smoking cigarettes. He has been smoking an average of .25 packs per day. He has never used smokeless tobacco. He reports current alcohol use. He reports that he does not use drugs.    Family History  Family History   Problem Relation Name Age of Onset    Heart attack Mother      Heart attack Father      Heart attack Paternal Grandmother          Allergies  Patient has no known allergies.    Review of Systems   All other systems reviewed and are negative.       Physical Exam  Vitals reviewed.   Constitutional:       Appearance: Normal appearance.   HENT:      Head: Normocephalic.   Cardiovascular:      Rate and Rhythm: Normal rate and regular rhythm.      Heart sounds: Normal heart sounds.   Pulmonary:      Effort: Pulmonary effort is normal.      Breath sounds: Normal breath sounds.   Abdominal:      General: Abdomen is flat. There is no distension.      Palpations: Abdomen is soft. There is no mass.      Tenderness: There is no abdominal tenderness. There is no guarding.       "Hernia: No hernia is present.   Genitourinary:     Testes: Normal.   Musculoskeletal:         General: Normal range of motion.      Cervical back: Normal range of motion.   Skin:     General: Skin is warm.   Neurological:      General: No focal deficit present.   Psychiatric:         Mood and Affect: Mood normal.          Last Recorded Vitals  Blood pressure 132/78, pulse 59, temperature 36.4 °C (97.5 °F), temperature source Temporal, resp. rate 17, height 1.88 m (6' 2\"), weight 123 kg (271 lb 2.7 oz), SpO2 96 %.    Relevant Results         Assessment/Plan   Active Problems: Low risk colon cancer   There are no active Hospital Problems.      COLONOSCOPY/BIOPSIES.  Risks include, but not limited to pain, infection, bleeding, perforation, missed lesions, aspiration, risk of cardiac, pulmonary, neurologic, locomotor, anesthetic events, incomplete colonoscopy, and other unforeseen complications including death      Brendon Funk MD    "

## 2023-11-30 NOTE — DISCHARGE INSTRUCTIONS
Patient Instructions after a Colonoscopy      The anesthetics, sedatives or narcotics which were given to you today will be acting in your body for the next 24 hours, so you might feel a little sleepy or groggy.  This feeling should slowly wear off. Carefully read and follow the instructions.     You received sedation today:  - Do not drive or operate any machinery or power tools of any kind.   - No alcoholic beverages today, not even beer or wine.  - Do not make any important decisions or sign any legal documents.  - No over the counter medications that contain alcohol or that may cause drowsiness.  - Do not make any important decisions or sign any legal documents.    While it is common to experience mild to moderate abdominal distention, gas, or belching after your procedure, if any of these symptoms occur following discharge from the GI Lab or within one week of having your procedure, call the [facility] to be advised whether a visit to your nearest Urgent Care or Emergency Department is indicated.  Take this paper with you if you go.     - If you develop an allergic reaction to the medications that were given during your procedure such as difficulty breathing, rash, hives, severe nausea, vomiting or lightheadedness.  - If you experience chest pain, shortness of breath, severe abdominal pain, fevers and chills.  -If you develop signs and symptoms of bleeding such as blood in your spit, if your stools turn black, tarry, or bloody  - If you have not urinated within 8 hours following your procedure.  - If your IV site becomes painful, red, inflamed, or looks infected.    If you received a biopsy/polypectomy/sphincterotomy the following instructions apply below:    __ Do not use Aspirin containing products, non-steroidal medications or anti-coagulants for one week following your procedure. (Examples of these types of medications are: Advil, Arthrotec, Aleve, Coumadin, Ecotrin, Heparin, Ibuprofen,  Indocin, Motrin, Naprosyn, Nuprin, Plavix, Vioxx, and Voltarin, or their generic forms.  This list is not all-inclusive.  Check with your physician or pharmacist before resuming medications.)   __ Eat a soft diet today.  Avoid foods that are poorly digested for the next 24 hours.  These foods would include: nuts, beans, lettuce, red meats, and fried foods. Start with liquids and advance your diet as tolerated, gradually work up to eating solids.   __ Do not have a Barium Study or Enema for one week.    Your physician recommends the additional following instructions:    -You have a contact number available for emergencies. The signs and symptoms of potential delayed complications were discussed with you. You may return to normal activities tomorrow.  -Resume your previous diet.  -Continue your present medications.   -We are waiting for your pathology results.  -Your physician has recommended a repeat colonoscopy (date to be determined after pending pathology results are reviewed) for surveillance based on pathology results.  -The findings and recommendations have been discussed with you.  -The findings and recommendations were discussed with your family.  - Please see Medication Reconciliation Form for new medication/medications prescribed.       If you experience any problems or have any questions following discharge from the GI Lab, please call:        Nurse Signature                                                                        Date___________________                                                                            Patient/Responsible Party Signature                                        Date___________________

## 2023-12-01 ENCOUNTER — OFFICE VISIT (OUTPATIENT)
Dept: SLEEP MEDICINE | Facility: CLINIC | Age: 53
End: 2023-12-01
Payer: COMMERCIAL

## 2023-12-01 VITALS
HEIGHT: 74 IN | BODY MASS INDEX: 33.75 KG/M2 | WEIGHT: 263 LBS | OXYGEN SATURATION: 96 % | SYSTOLIC BLOOD PRESSURE: 141 MMHG | HEART RATE: 64 BPM | DIASTOLIC BLOOD PRESSURE: 77 MMHG

## 2023-12-01 DIAGNOSIS — J31.0 CHRONIC RHINITIS: ICD-10-CM

## 2023-12-01 DIAGNOSIS — G47.30 SLEEP-RELATED BREATHING DISORDER: Primary | ICD-10-CM

## 2023-12-01 DIAGNOSIS — F17.210 CIGARETTE NICOTINE DEPENDENCE WITHOUT COMPLICATION: ICD-10-CM

## 2023-12-01 DIAGNOSIS — E66.09 CLASS 1 OBESITY DUE TO EXCESS CALORIES WITHOUT SERIOUS COMORBIDITY WITH BODY MASS INDEX (BMI) OF 33.0 TO 33.9 IN ADULT: ICD-10-CM

## 2023-12-01 PROBLEM — E66.811 CLASS 1 OBESITY DUE TO EXCESS CALORIES WITHOUT SERIOUS COMORBIDITY WITH BODY MASS INDEX (BMI) OF 33.0 TO 33.9 IN ADULT: Status: ACTIVE | Noted: 2023-12-01

## 2023-12-01 PROCEDURE — 3008F BODY MASS INDEX DOCD: CPT | Performed by: PSYCHIATRY & NEUROLOGY

## 2023-12-01 PROCEDURE — 99204 OFFICE O/P NEW MOD 45 MIN: CPT | Performed by: PSYCHIATRY & NEUROLOGY

## 2023-12-01 ASSESSMENT — PAIN SCALES - GENERAL: PAINLEVEL_OUTOF10: 0 - NO PAIN

## 2023-12-01 NOTE — PROGRESS NOTES
Patient: Buzz Suh    [id number]  : 1970 -- AGE 53 y.o.    Provider: Diogo Jacobs MD     [facility]   Service Date: 2023              [facility]  New Visit Note      The patient's referring provider is: Brendon Funk MD    HPI: Buzz Suh is a 53 y.o. male with PMH notable for cardiac murmur, hypothyroidism, hyperlipidemia, allergic rhinitis, obesity, and nicotine dependence, who presents today as a referral from his general surgeon after having witnessed apneas during his colonoscopy yesterday, raising concern for undiagnosed sleep apnea.      He feels he sleeps well, wakes up feeling refreshed when using Flonase, but not if he does not use it.    Prefer sleeping prone because of his nasal congestion, which has been a problem since childhood, and sleeping supine causes bothersome postnasal drip. Occasionally uses his Flonase, but not as frequently as he should. In the past he tried taking Zyrtec regularly and noticed no improvement in his nasal congestion/post nasal drip.    Stopped working as a  a couple of years ago and has since gained 30-40 lbs.     NIGHTTIME SYMPTOMS:   Snoring: occasionally when prone, more so when supine. Snoring started many years ago. Unsure if worse over time. Snoring is loud.  Witnessed apnea:  occasional  Nocturnal gasping: No  Nocturnal choking: No  Sleep walking: No  Sleep talking:  rare - related to work stress  Dream enactment: no  Bruxism: no  Nocturnal excessive sweating: No  Nocturnal GERD: rare  Morning headaches: No  Morning dry mouth/sore throat: No  Nocturia: in the last year or so about 1x/night  Restless sleep: no  Sleep paralysis: No  Hypnagogic/hypnopompic hallucinations: No  Bedroom environment is conducive to sleep: Yes    DAYTIME SYMPTOMS  [place]: 4 [0, 1, 0, 0, 3, 0, 0, 0]  Daytime sleepiness: occasional, mild if he did not sleep well  Fatigue: generally none  Trouble with memory/concentration:  No  Dozing: No  Feeling sleepy while driving: Denies    RLS symptoms: No   Bed partner mentions pt kicks in sleep: No    Cataplexy: no    SLEEP HABITS:   Preferred sleep position: prone  Bedtime: 10 pm, sleep latency - nearly immediate  Wake time: 6 am  Napping: occasional.   Total estimated sleep per 24 hrs: 7-8 hours most nights    PRIOR SLEEP STUDIES:  None    PRIOR TREATMENTS:  No stimulants or sleep aids.    Patient Active Problem List   Diagnosis    Seasonal allergic rhinitis    Cardiac murmur    Health care maintenance    Hypothyroidism    Hyperlipidemia    Current smoker    Allergic rhinitis     Past Medical History:   Diagnosis Date    Acute bronchitis 06/06/2023    Acute gout of right ankle 06/06/2023    Acute maxillary sinusitis 06/06/2023    Ankle injury 06/06/2023    Arthritis of right ankle 06/06/2023    Elbow pain 06/06/2023    Foot injury 06/06/2023    Hypothyroidism     Olecranon bursitis of left elbow 06/06/2023    Post surgical complication 06/06/2023    Puncture wound 06/06/2023    Radial head fracture 06/06/2023    Right ankle pain 06/06/2023    Strain of right triceps 06/06/2023    Triceps tendinitis 06/06/2023     Past Surgical History:   Procedure Laterality Date    ELBOW SURGERY  07/30/2018    Elbow Surgery    KNEE SURGERY  07/30/2018    Knee Surgery    OTHER SURGICAL HISTORY  07/30/2018    Elbow Arthroplasty Of Radial Head     Current Outpatient Medications   Medication Sig Dispense Refill    fluticasone (Flonase) 50 mcg/actuation nasal spray Administer 1 spray into each nostril once daily. Shake gently. Before first use, prime pump. After use, clean tip and replace cap.      levothyroxine (Synthroid, Levoxyl) 25 mcg tablet TAKE 1 TABLET (25 MCG) BY MOUTH DAILY 90 tablet 0    rosuvastatin (Crestor) 10 mg tablet TAKE 1 TABLET BY MOUTH EVERY DAY 90 tablet 0     No current facility-administered medications for this visit.     No Known Allergies    FAMILY HISTORY OF SLEEP DISORDERS: None that  "he knows of, but father falls asleep watching TV    Family History   Problem Relation Name Age of Onset    Heart attack Mother      Heart attack Father      Heart attack Paternal Grandmother         SOCIAL HISTORY  Employment: / -   Lives with: wife and dogs  Alcohol: was drinking more heavily in the evenings previously (6-8 drinks per day), now around 10 drinks/week (1-2/day)  Cigarettes: off and on, wants to talk to PCP to work on quitting, interested in trying Chantix. Smokes about half ppd  Illicits: no  Caffeine: 1-2 cups of coffee/day     ROS: The detailed review of symptoms sheet filled by the patient was reviewed today. 12 point ROS positive for nasal congestion, postnasal drip, sinus problems, cough, and joint pain that affects sleep. All other items/systems are negative.    PHYSICAL EXAMINATION:   Vitals:    12/01/23 1029   BP: 141/77   BP Location: Left arm   Patient Position: Sitting   BP Cuff Size: Large adult   Pulse: 64   SpO2: 96%   Weight: 119 kg (263 lb)   Height: 1.88 m (6' 2.02\")     Body mass index is 33.75 kg/m².  General: Awake. Alert. Comfortable. No apparent distress.   Speech: Normal  Comprehension: Normal  Mood: Stable  Affect: Appropriate  Eyes:   Eyelids: normal            ENT:          Nares patent bilaterally. Septum deviation absent. Tovar tongue position III. Tongue scalloping is present, tongue is enlarged, soft palate is mildly elongated, hard palate is not high arched. Uvula is not enlarged. Retrognathia is not present. Tonsils are not enlarged. Dentition good.           Neck:          Circumference: 19.25\"  Cardiac: Regular in rate and rhythm. No murmurs. No edema in bilateral lower extremities.  Pul:         Clear to auscultation bilaterally. Normal respiratory effort   Abd:         obese  Neuro: Alert, well-oriented. Cranial nerves II-XII grossly normal and symmetric.  Moves all limbs symmetrically with no evidence of significant focal " weakness. No abnormal movements noted. Normal gait        LABS/DIAGNOSTICS:  Lab Results   Component Value Date    HGB 15.0 06/13/2023    CO2 25 06/13/2023    TSH 6.12 (H) 06/13/2023    FREET4 0.66 06/13/2023    HGBA1C 6.0 (A) 06/13/2023    VITD25 25 (A) 06/13/2023    [id number] 368 06/13/2023        Echo: none on file  PFTs: none on file      ASSESSMENT AND PLAN: Mr. Buzz Suh is a 53 y.o. male with symptoms suggestive of sleep apnea who appeared to have sleep apnea when sedated during his recent colonoscopy.      #sleep disordered breathing - high pre-test probability of SUSANA  -We discussed the risk factors for sleep apnea, pathophysiology of sleep apnea, treatment options, and potential long-term complications of untreated SUSANA, including cardiovascular and metabolic complications. We will start evaluation with a home sleep test.   -advised continuing to avoid supine sleep and to be more consistent with Flonase daily  -cautioned about the negative effects of alcohol on sleep    #obesity  -weight loss encouraged    #chronic rhinitis  -advised daily use of his Flonase, instructed on proper usage    #nicotine dependence  -pt to work with PCP to quit smoking - discussed quitting with pt      All of the above was discussed with the patient in detail. He voiced an understanding of the above and was agreeable to proceed further as advised. Procedure for the sleep study was discussed with him.  Around 45 minutes were spent on this encounter.      FOLLOW UP:  After study to discuss results

## 2023-12-11 ENCOUNTER — TELEPHONE (OUTPATIENT)
Dept: SURGERY | Facility: CLINIC | Age: 53
End: 2023-12-11
Payer: COMMERCIAL

## 2023-12-11 LAB
LABORATORY COMMENT REPORT: NORMAL
PATH REPORT.FINAL DX SPEC: NORMAL
PATH REPORT.GROSS SPEC: NORMAL
PATH REPORT.TOTAL CANCER: NORMAL

## 2023-12-11 NOTE — TELEPHONE ENCOUNTER
----- Message from Brendon Funk MD sent at 12/11/2023 10:32 AM EST -----  Let patient know polyp was removed and nothing to worry about. A my chart reminder will be sent for a repeat colonoscopy in 10 years

## 2024-11-14 PROBLEM — E66.9 OBESITY: Status: ACTIVE | Noted: 2023-12-01

## 2024-11-14 PROBLEM — J20.9 ACUTE EXACERBATION OF CHRONIC BRONCHITIS (MULTI): Status: ACTIVE | Noted: 2023-06-06

## 2024-11-14 PROBLEM — S01.81XA FACIAL LACERATION: Status: ACTIVE | Noted: 2024-11-14

## 2024-11-14 PROBLEM — H11.30 SUBCONJUNCTIVAL HEMORRHAGE: Status: ACTIVE | Noted: 2024-11-14

## 2024-11-14 PROBLEM — R35.89 POLYURIA: Status: ACTIVE | Noted: 2023-06-13

## 2024-11-14 PROBLEM — S00.83XA CONTUSION OF FACE: Status: ACTIVE | Noted: 2024-11-14

## 2024-11-14 PROBLEM — J42 ACUTE EXACERBATION OF CHRONIC BRONCHITIS (MULTI): Status: ACTIVE | Noted: 2023-06-06

## 2024-11-14 PROBLEM — F10.929 ALCOHOLIC INTOXICATION (CMS-HCC): Status: ACTIVE | Noted: 2024-11-14

## 2024-11-14 PROBLEM — S41.119A LACERATION OF UPPER EXTREMITY: Status: ACTIVE | Noted: 2024-11-14
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
## 2024-11-14 PROBLEM — S02.85XA FRACTURE OF ORBIT (MULTI): Status: ACTIVE | Noted: 2024-11-14

## 2024-11-18 ENCOUNTER — APPOINTMENT (OUTPATIENT)
Dept: PRIMARY CARE | Facility: CLINIC | Age: 54
End: 2024-11-18
Payer: COMMERCIAL

## 2024-11-18 VITALS
BODY MASS INDEX: 32.62 KG/M2 | HEIGHT: 74 IN | TEMPERATURE: 96.9 F | DIASTOLIC BLOOD PRESSURE: 83 MMHG | HEART RATE: 63 BPM | SYSTOLIC BLOOD PRESSURE: 136 MMHG | OXYGEN SATURATION: 95 % | RESPIRATION RATE: 16 BRPM | WEIGHT: 254.2 LBS

## 2024-11-18 DIAGNOSIS — M25.562 LEFT KNEE PAIN, UNSPECIFIED CHRONICITY: ICD-10-CM

## 2024-11-18 DIAGNOSIS — E03.9 HYPOTHYROIDISM, UNSPECIFIED TYPE: ICD-10-CM

## 2024-11-18 DIAGNOSIS — F17.210 CIGARETTE SMOKER: ICD-10-CM

## 2024-11-18 DIAGNOSIS — E78.5 HYPERLIPIDEMIA, UNSPECIFIED HYPERLIPIDEMIA TYPE: ICD-10-CM

## 2024-11-18 DIAGNOSIS — E78.00 HYPERCHOLESTEREMIA: ICD-10-CM

## 2024-11-18 PROCEDURE — 3008F BODY MASS INDEX DOCD: CPT | Performed by: INTERNAL MEDICINE

## 2024-11-18 PROCEDURE — 99396 PREV VISIT EST AGE 40-64: CPT | Performed by: INTERNAL MEDICINE

## 2024-11-18 RX ORDER — ROSUVASTATIN CALCIUM 10 MG/1
10 TABLET, COATED ORAL DAILY
Qty: 90 TABLET | Refills: 0 | Status: SHIPPED | OUTPATIENT
Start: 2024-11-18

## 2024-11-18 RX ORDER — LEVOTHYROXINE SODIUM 25 UG/1
25 TABLET ORAL DAILY
Qty: 90 TABLET | Refills: 0 | Status: SHIPPED | OUTPATIENT
Start: 2024-11-18

## 2024-11-18 RX ORDER — VARENICLINE TARTRATE 1 MG/1
1 TABLET, FILM COATED ORAL 2 TIMES DAILY
Qty: 60 TABLET | Refills: 2 | Status: SHIPPED | OUTPATIENT
Start: 2024-11-18 | End: 2025-02-16

## 2024-11-18 ASSESSMENT — ENCOUNTER SYMPTOMS
CONSTITUTIONAL NEGATIVE: 1
RESPIRATORY NEGATIVE: 1
CARDIOVASCULAR NEGATIVE: 1
GASTROINTESTINAL NEGATIVE: 1

## 2024-11-18 ASSESSMENT — PATIENT HEALTH QUESTIONNAIRE - PHQ9
1. LITTLE INTEREST OR PLEASURE IN DOING THINGS: NOT AT ALL
1. LITTLE INTEREST OR PLEASURE IN DOING THINGS: NOT AT ALL
2. FEELING DOWN, DEPRESSED OR HOPELESS: NOT AT ALL
2. FEELING DOWN, DEPRESSED OR HOPELESS: NOT AT ALL
SUM OF ALL RESPONSES TO PHQ9 QUESTIONS 1 AND 2: 0
SUM OF ALL RESPONSES TO PHQ9 QUESTIONS 1 AND 2: 0

## 2024-11-18 ASSESSMENT — PAIN SCALES - GENERAL: PAINLEVEL_OUTOF10: 0-NO PAIN

## 2024-11-18 ASSESSMENT — COLUMBIA-SUICIDE SEVERITY RATING SCALE - C-SSRS
2. HAVE YOU ACTUALLY HAD ANY THOUGHTS OF KILLING YOURSELF?: NO
6. HAVE YOU EVER DONE ANYTHING, STARTED TO DO ANYTHING, OR PREPARED TO DO ANYTHING TO END YOUR LIFE?: NO
1. IN THE PAST MONTH, HAVE YOU WISHED YOU WERE DEAD OR WISHED YOU COULD GO TO SLEEP AND NOT WAKE UP?: NO

## 2024-11-18 NOTE — PROGRESS NOTES
"Patient ID: He states his left knee will go \"numb\" if he stands on it too long. He states as long as he is moving, he has no issues with it. She states that it has occurred about 12 times in the last month. He states that he has urinary frequency. He states he wakes up 1-2 times per night to urinate. He states more if he drinks beer at night, but he reports he has cut back on alcohol intake, states he does not drink every day. He denies any depression. Denies any cold or heat intolerance. Denies any SOB, chest pain or cough. He states he had surgery on his left knee when he was a kid. He states that his left leg was longer than his right leg and they \"scraped a growth plate\" to even up the growth of both legs. He denies any tenderness when applying pressure to left knee. He would like to quit smoking, states he has tried nicotine gum in the past and it was ineffective.    HEALTH MAINTENANCE: Annual  Wellness Physical  Last Office Visit: 6/6/23   Last Labs: 6/13/23  PSA Screening (starting at age 55 till 69): 6/13/23 normal  Colonoscopy (45-75 or age 40 with 1st degree relative dx colon ca): 11/10/23  Lung cancer screening (50-76 y/o x 20 pk yr for at least 20 yrs + current smoker OR quit in last 15 years, no CT w/I last year):   CT heart calcium score on 3/7/23: Coronary artery calcium score of 0*.   - Dr. Funk noted sleep apnea during colonoscopy on 11/10/23 and referred hiim to sleep medicine  - seen by DR. Jacobs on 12/1/23 and sleep study ordered, but patient admits he has not had done yet    HPI Buzz Suh is a 54 y.o. male with PMH remarkable for hypothyroidism, HLD, cardiac murmur, gout, allergic rhinitis, obesity, and nicotine dependence, ho presents to the office today for Annual Exam.    Past Medical History:   Diagnosis Date    Acute bronchitis 06/06/2023    Acute gout of right ankle 06/06/2023    Acute maxillary sinusitis 06/06/2023    Ankle injury 06/06/2023    Arthritis of right ankle 06/06/2023    " "Elbow pain 06/06/2023    Foot injury 06/06/2023    Hypothyroidism     Olecranon bursitis of left elbow 06/06/2023    Post surgical complication 06/06/2023    Puncture wound 06/06/2023    Radial head fracture 06/06/2023    Right ankle pain 06/06/2023    Strain of right triceps 06/06/2023    Triceps tendinitis 06/06/2023      Past Surgical History:   Procedure Laterality Date    ELBOW SURGERY  07/30/2018    Elbow Surgery    KNEE SURGERY  07/30/2018    Knee Surgery    OTHER SURGICAL HISTORY  07/30/2018    Elbow Arthroplasty Of Radial Head      Social History     Tobacco Use    Smoking status: Every Day     Current packs/day: 0.25     Types: Cigarettes    Smokeless tobacco: Never   Vaping Use    Vaping status: Never Used   Substance Use Topics    Alcohol use: Yes    Drug use: Never     Family History   Problem Relation Name Age of Onset    Heart attack Mother      Heart attack Father      Heart attack Paternal Grandmother        Immunization History   Administered Date(s) Administered    Tdap vaccine, age 7 year and older (BOOSTRIX, ADACEL) 07/30/2018, 04/05/2022     Review of Systems   Constitutional: Negative.    HENT: Negative.     Respiratory: Negative.     Cardiovascular: Negative.    Gastrointestinal: Negative.    Neurological:         + left knee numbness     No Known Allergies  Visit Vitals  /83 (BP Location: Left arm)   Pulse 63   Temp 36.1 °C (96.9 °F) (Temporal)   Resp 16   Ht 1.88 m (6' 2\")   Wt 115 kg (254 lb 3.2 oz)   SpO2 95%   BMI 32.64 kg/m²   Smoking Status Every Day   BSA 2.45 m²     Physical Exam  Vitals reviewed.   Constitutional:       Appearance: Normal appearance.   HENT:      Head: Normocephalic and atraumatic.   Cardiovascular:      Rate and Rhythm: Normal rate and regular rhythm.      Pulses: Normal pulses.      Heart sounds: Normal heart sounds.   Pulmonary:      Effort: Pulmonary effort is normal.      Breath sounds: Normal breath sounds.   Abdominal:      General: Bowel sounds are " normal.      Palpations: Abdomen is soft.   Musculoskeletal:         General: Normal range of motion.   Skin:     General: Skin is warm and dry.   Neurological:      General: No focal deficit present.      Mental Status: He is alert and oriented to person, place, and time.   Psychiatric:         Mood and Affect: Mood normal.         Behavior: Behavior normal.       Current Outpatient Medications   Medication Instructions    fluticasone (Flonase) 50 mcg/actuation nasal spray 1 spray, Daily    levothyroxine (SYNTHROID, LEVOXYL) 25 mcg, oral, Daily    rosuvastatin (CRESTOR) 10 mg, oral, Daily     Lab Results   Component Value Date    WBC 5.5 06/13/2023    HGB 15.0 06/13/2023    HCT 45.5 06/13/2023     06/13/2023    CHOL 213 (H) 06/13/2023    TRIG 291 (H) 06/13/2023    HDL 34.1 (A) 06/13/2023    ALT 25 06/13/2023    AST 19 06/13/2023     (L) 06/13/2023    K 4.1 06/13/2023     06/13/2023    CREATININE 1.20 06/13/2023    BUN 23 06/13/2023    CO2 25 06/13/2023    TSH 6.12 (H) 06/13/2023    INR 1.0 04/05/2022    HGBA1C 6.0 (A) 06/13/2023       Problem List Items Addressed This Visit             ICD-10-CM    Hypothyroidism E03.9     Continue with levothyroxine         Relevant Medications    levothyroxine (Synthroid, Levoxyl) 25 mcg tablet    Other Relevant Orders    CBC and Auto Differential    TSH    T4, free    Hypercholesteremia E78.00     Continue with rosuvastatin         Relevant Medications    rosuvastatin (Crestor) 10 mg tablet    Other Relevant Orders    Lipid panel    Comprehensive metabolic panel    Cigarette smoker F17.210    Relevant Medications    varenicline (Chantix) 1 mg tablet    Other Relevant Orders    CT lung screening low dose    Left knee pain M25.562    Relevant Orders    Referral to Orthopaedic Surgery     - he was previously seen by sleep medicine for possible sleep apnea, ordered a sleep study at that time, but he has yet to schedule test  - advised patient that he should have  sleep study, and he states he is going to work on quitting smoking which he knows will benefit his overall health  --------------------  Written by Nathalia Nation RN, acting as a scribe for Dr. Andrade. This note accurately reflects the work and decisions made by Dr. Andrade.     I, Dr. Andrade, attest all medical record entries made by the scribe were under my direction and were personally dictated by me. I have reviewed the chart and agree that the record accurately reflects my performance of the history, physical exam, and assessment and plan.

## 2024-11-18 NOTE — PATIENT INSTRUCTIONS
It was great to see you in the office today! Here is what we discussed at your visit today:    Please continue to take your current medications     Please get bloodwork drawn as soon as you are able. We will call you with results.    A referral was placed for CT lung screening, please schedule this as soon as you are able    A prescription was sent in for Chantix to help you stop smoking    Schedule an appointment with [facility] orthopedics regarding your knee  [phone]

## 2024-11-19 PROBLEM — F17.210 CIGARETTE SMOKER: Status: ACTIVE | Noted: 2023-11-30

## 2024-11-19 PROBLEM — F10.929 ALCOHOLIC INTOXICATION (CMS-HCC): Status: RESOLVED | Noted: 2024-11-14 | Resolved: 2024-11-19

## 2024-11-19 PROBLEM — M25.562 LEFT KNEE PAIN: Status: ACTIVE | Noted: 2024-11-19

## 2024-11-19 PROBLEM — E78.00 HYPERCHOLESTEREMIA: Status: ACTIVE | Noted: 2023-11-30

## 2024-11-26 ENCOUNTER — LAB (OUTPATIENT)
Dept: LAB | Facility: LAB | Age: 54
End: 2024-11-26
Payer: COMMERCIAL

## 2024-11-26 DIAGNOSIS — E03.9 HYPOTHYROIDISM, UNSPECIFIED TYPE: ICD-10-CM

## 2024-11-26 DIAGNOSIS — E78.00 HYPERCHOLESTEREMIA: Primary | ICD-10-CM

## 2024-11-26 DIAGNOSIS — E78.00 HYPERCHOLESTEREMIA: ICD-10-CM

## 2024-11-26 DIAGNOSIS — Z00.00 HEALTH CARE MAINTENANCE: ICD-10-CM

## 2024-11-26 LAB
ALBUMIN SERPL BCP-MCNC: 4.5 G/DL (ref 3.4–5)
ALP SERPL-CCNC: 72 U/L (ref 33–120)
ALT SERPL W P-5'-P-CCNC: 23 U/L (ref 10–52)
ANION GAP SERPL CALC-SCNC: 13 MMOL/L (ref 10–20)
AST SERPL W P-5'-P-CCNC: 24 U/L (ref 9–39)
BASOPHILS # BLD AUTO: 0.07 X10*3/UL (ref 0–0.1)
BASOPHILS NFR BLD AUTO: 1.2 %
BILIRUB SERPL-MCNC: 0.5 MG/DL (ref 0–1.2)
BUN SERPL-MCNC: 19 MG/DL (ref 6–23)
CALCIUM SERPL-MCNC: 9.9 MG/DL (ref 8.6–10.6)
CHLORIDE SERPL-SCNC: 103 MMOL/L (ref 98–107)
CHOLEST SERPL-MCNC: 197 MG/DL (ref 0–199)
CHOLESTEROL/HDL RATIO: 5.3
CO2 SERPL-SCNC: 29 MMOL/L (ref 21–32)
CREAT SERPL-MCNC: 1.3 MG/DL (ref 0.5–1.3)
EGFRCR SERPLBLD CKD-EPI 2021: 66 ML/MIN/1.73M*2
EOSINOPHIL # BLD AUTO: 0.17 X10*3/UL (ref 0–0.7)
EOSINOPHIL NFR BLD AUTO: 2.9 %
ERYTHROCYTE [DISTWIDTH] IN BLOOD BY AUTOMATED COUNT: 12.4 % (ref 11.5–14.5)
GLUCOSE SERPL-MCNC: 100 MG/DL (ref 74–99)
HCT VFR BLD AUTO: 46.2 % (ref 41–52)
HDLC SERPL-MCNC: 37.5 MG/DL
HGB BLD-MCNC: 15.2 G/DL (ref 13.5–17.5)
IMM GRANULOCYTES # BLD AUTO: 0.05 X10*3/UL (ref 0–0.7)
IMM GRANULOCYTES NFR BLD AUTO: 0.8 % (ref 0–0.9)
LDLC SERPL CALC-MCNC: 114 MG/DL
LYMPHOCYTES # BLD AUTO: 2.2 X10*3/UL (ref 1.2–4.8)
LYMPHOCYTES NFR BLD AUTO: 37 %
MCH RBC QN AUTO: 31.3 PG (ref 26–34)
MCHC RBC AUTO-ENTMCNC: 32.9 G/DL (ref 32–36)
MCV RBC AUTO: 95 FL (ref 80–100)
MONOCYTES # BLD AUTO: 0.55 X10*3/UL (ref 0.1–1)
MONOCYTES NFR BLD AUTO: 9.2 %
NEUTROPHILS # BLD AUTO: 2.91 X10*3/UL (ref 1.2–7.7)
NEUTROPHILS NFR BLD AUTO: 48.9 %
NON HDL CHOLESTEROL: 160 MG/DL (ref 0–149)
NRBC BLD-RTO: 0 /100 WBCS (ref 0–0)
PLATELET # BLD AUTO: 265 X10*3/UL (ref 150–450)
POTASSIUM SERPL-SCNC: 4.8 MMOL/L (ref 3.5–5.3)
PROT SERPL-MCNC: 6.7 G/DL (ref 6.4–8.2)
RBC # BLD AUTO: 4.86 X10*6/UL (ref 4.5–5.9)
SODIUM SERPL-SCNC: 140 MMOL/L (ref 136–145)
T4 FREE SERPL-MCNC: 1.01 NG/DL (ref 0.78–1.48)
TRIGL SERPL-MCNC: 229 MG/DL (ref 0–149)
TSH SERPL-ACNC: 6.71 MIU/L (ref 0.44–3.98)
VLDL: 46 MG/DL (ref 0–40)
WBC # BLD AUTO: 6 X10*3/UL (ref 4.4–11.3)

## 2024-11-26 PROCEDURE — 84443 ASSAY THYROID STIM HORMONE: CPT

## 2024-11-26 PROCEDURE — 84439 ASSAY OF FREE THYROXINE: CPT

## 2024-11-26 PROCEDURE — 80061 LIPID PANEL: CPT

## 2024-11-26 PROCEDURE — 83036 HEMOGLOBIN GLYCOSYLATED A1C: CPT

## 2024-11-26 PROCEDURE — 85025 COMPLETE CBC W/AUTO DIFF WBC: CPT

## 2024-11-26 PROCEDURE — 36415 COLL VENOUS BLD VENIPUNCTURE: CPT

## 2024-11-26 PROCEDURE — 80053 COMPREHEN METABOLIC PANEL: CPT

## 2024-11-26 RX ORDER — ROSUVASTATIN CALCIUM 20 MG/1
20 TABLET, COATED ORAL DAILY
Qty: 100 TABLET | Refills: 0 | Status: SHIPPED | OUTPATIENT
Start: 2024-11-26 | End: 2025-12-31

## 2024-11-27 DIAGNOSIS — Z00.00 HEALTH CARE MAINTENANCE: ICD-10-CM

## 2024-11-27 LAB
EST. AVERAGE GLUCOSE BLD GHB EST-MCNC: 123 MG/DL
HBA1C MFR BLD: 5.9 %

## 2024-12-10 ENCOUNTER — HOSPITAL ENCOUNTER (OUTPATIENT)
Dept: RADIOLOGY | Facility: HOSPITAL | Age: 54
Discharge: HOME | End: 2024-12-10
Payer: COMMERCIAL

## 2024-12-10 DIAGNOSIS — F17.210 CIGARETTE SMOKER: ICD-10-CM

## 2024-12-10 PROCEDURE — 71271 CT THORAX LUNG CANCER SCR C-: CPT | Performed by: RADIOLOGY

## 2024-12-10 PROCEDURE — 71271 CT THORAX LUNG CANCER SCR C-: CPT

## 2024-12-16 ENCOUNTER — OFFICE VISIT (OUTPATIENT)
Dept: ORTHOPEDIC SURGERY | Facility: CLINIC | Age: 54
End: 2024-12-16
Payer: COMMERCIAL

## 2024-12-16 ENCOUNTER — HOSPITAL ENCOUNTER (OUTPATIENT)
Dept: RADIOLOGY | Facility: CLINIC | Age: 54
Discharge: HOME | End: 2024-12-16
Payer: COMMERCIAL

## 2024-12-16 DIAGNOSIS — M25.552 LEFT HIP PAIN: ICD-10-CM

## 2024-12-16 DIAGNOSIS — S76.012A HIP STRAIN, LEFT, INITIAL ENCOUNTER: ICD-10-CM

## 2024-12-16 DIAGNOSIS — M25.562 LEFT KNEE PAIN, UNSPECIFIED CHRONICITY: ICD-10-CM

## 2024-12-16 DIAGNOSIS — M25.552 LEFT HIP PAIN: Primary | ICD-10-CM

## 2024-12-16 DIAGNOSIS — M47.27 LUMBOSACRAL RADICULOPATHY DUE TO DEGENERATIVE JOINT DISEASE OF SPINE: ICD-10-CM

## 2024-12-16 DIAGNOSIS — S83.92XA SPRAIN OF LEFT KNEE, INITIAL ENCOUNTER: ICD-10-CM

## 2024-12-16 PROCEDURE — 99213 OFFICE O/P EST LOW 20 MIN: CPT | Performed by: ORTHOPAEDIC SURGERY

## 2024-12-16 PROCEDURE — 73502 X-RAY EXAM HIP UNI 2-3 VIEWS: CPT | Mod: LEFT SIDE | Performed by: RADIOLOGY

## 2024-12-16 PROCEDURE — 73502 X-RAY EXAM HIP UNI 2-3 VIEWS: CPT | Mod: LT

## 2024-12-16 PROCEDURE — 99203 OFFICE O/P NEW LOW 30 MIN: CPT | Performed by: ORTHOPAEDIC SURGERY

## 2024-12-16 PROCEDURE — 73560 X-RAY EXAM OF KNEE 1 OR 2: CPT | Mod: LT

## 2024-12-16 ASSESSMENT — PAIN DESCRIPTION - DESCRIPTORS: DESCRIPTORS: ACHING

## 2024-12-16 ASSESSMENT — ENCOUNTER SYMPTOMS
OCCASIONAL FEELINGS OF UNSTEADINESS: 0
LOSS OF SENSATION IN FEET: 0
DEPRESSION: 0

## 2024-12-16 ASSESSMENT — PAIN SCALES - GENERAL: PAINLEVEL_OUTOF10: 2

## 2024-12-16 ASSESSMENT — PAIN - FUNCTIONAL ASSESSMENT: PAIN_FUNCTIONAL_ASSESSMENT: 0-10

## 2024-12-16 NOTE — PATIENT INSTRUCTIONS
Thank you for coming to see us today!     Continue to use tylenol for pain control.   Rest, ice and elevate and remember to do exercises.   We are giving you a referral to PT    Follow up  in 6 weeks for lumbar spine will need x-ray of lumbar spine

## 2024-12-16 NOTE — PROGRESS NOTES
Subjective      Chief Complaint   Patient presents with    Left Knee - New Patient Visit, Pain    Left Hip - New Patient Visit, Pain        Past Surgical History:   Procedure Laterality Date    ELBOW SURGERY  07/30/2018    Elbow Surgery    KNEE SURGERY  07/30/2018    Knee Surgery    OTHER SURGICAL HISTORY  07/30/2018    Elbow Arthroplasty Of Radial Head        Past Medical History:   Diagnosis Date    Acute bronchitis 06/06/2023    Acute gout of right ankle 06/06/2023    Acute maxillary sinusitis 06/06/2023    Ankle injury 06/06/2023    Arthritis of right ankle 06/06/2023    Elbow pain 06/06/2023    Foot injury 06/06/2023    Hypothyroidism     Olecranon bursitis of left elbow 06/06/2023    Post surgical complication 06/06/2023    Puncture wound 06/06/2023    Radial head fracture 06/06/2023    Right ankle pain 06/06/2023    Strain of right triceps 06/06/2023    Triceps tendinitis 06/06/2023        HPI  This 54 year old patient presents today with left knee pain (8/10) and left hip pain. The patient states that this left knee and hip pain has been worsening and persistent for months. The patient denies trauma or injury to the left knee or the left hip. The patient states that the left knee pain and left hip pain is worse with and aggravated by prolonged walking and standing. The patient states that this left knee pain impairs their ability to complete normal activities of daily living. The patient has tried Tylenol and ibuprofen with no relief.    No Known Allergies     Family History   Problem Relation Name Age of Onset    Heart attack Mother      Heart attack Father      Heart attack Paternal Grandmother          Social History     Socioeconomic History    Marital status:      Spouse name: Not on file    Number of children: Not on file    Years of education: Not on file    Highest education level: Not on file   Occupational History    Not on file   Tobacco Use    Smoking status: Every Day     Current  packs/day: 0.25     Types: Cigarettes    Smokeless tobacco: Never   Vaping Use    Vaping status: Never Used   Substance and Sexual Activity    Alcohol use: Yes    Drug use: Never    Sexual activity: Not on file   Other Topics Concern    Not on file   Social History Narrative    Not on file     Social Drivers of Health     Financial Resource Strain: Not on file   Food Insecurity: Not on file   Transportation Needs: Not on file   Physical Activity: Not on file   Stress: Not on file   Social Connections: Not on file   Intimate Partner Violence: Not on file   Housing Stability: Not on file        ROS  CARDIOLOGY:   Negative for chest pain, shortness of breath.   RESPIRATORY:   Negative for chest pain, shortness of breath.   MUSCULOSKELETAL:   See HPI for details.   NEUROLOGY:   Negative for tingling, numbness, weakness.    Objective    There is no height or weight on file to calculate BMI.     Physical Exam  GENERAL:          General Appearance:  This is a pleasant patient with appropriate affect, in no acute distress.   DERMATOLOGY:          Skin: skin at the neck, upper and lower back, and trunk is intact. There is no evidence of skin rash, skin breakdown or ulceration, or atrophic skin change.   EXTREMITIES:          Vascular:  Right, left hands and feet are warm with good color and pulses. Right and left calf and thigh are nontender and nonswollen.   NEUROLOGICAL:          Orientation:  Patient is alert and oriented to person, place, time and situation. Right and left upper and lower extremity motor and sensory examinations are intact.  MUSCULOSKELETAL: Neck: No tenderness. No pain or limitation with range of motion. Back: Diffusely no pain with active or passive range of motion.  Tenderness. Straight leg test negative bilaterally. Right and left hips: No tenderness over the right or left greater trochanter. Right and left lower extremity in good position. Right knee: Nontender. No pain with gentle ROM. left knee:  There is diffuse tenderness over the knee. The patient has ROM from 0-120. McMurrays is negative. Anterior drawer and lachmans are negative. There is not an effusion present. The left knee is stable to valgus and varus stressing. Nontender in the left calf. Compartments are soft. Neurovascular is intact to light touch. The patient walks with a painful gait favoring the left  knee while walking.      Buzz was seen today for new patient visit, pain, new patient visit and pain.  Diagnoses and all orders for this visit:  Left hip pain (Primary)  -     XR hip left with pelvis when performed 2 or 3 views; Future  Left knee pain, unspecified chronicity  -     XR knee left 1-2 views; Future     Options are discussed with the patient in detail. The patient is given a prescription for physical therapy to evaluate and treat with gentle strengthening and ROM exercises with modalities as needed. The patient is instructed regarding activity modification and risk for further injury with falling or trauma, ice, provider directed at home gentle strengthening and ROM exercises, and the appropriate use of Tylenol as needed for pain with its potential adverse reactions and side effects. The patient understands. Follow up in 4 weeks or sooner as needed. Please note that this report has been produced using speech recognition software.  It may contain errors related to grammar, punctuation or spelling.  Electronically signed, but not reviewed.     Davidson Henning MD

## 2024-12-21 DIAGNOSIS — F17.210 CIGARETTE SMOKER: ICD-10-CM

## 2024-12-23 RX ORDER — VARENICLINE TARTRATE 1 MG/1
1 TABLET, FILM COATED ORAL 2 TIMES DAILY
Qty: 180 TABLET | Refills: 1 | Status: SHIPPED | OUTPATIENT
Start: 2024-12-23 | End: 2025-06-21

## 2025-01-15 ENCOUNTER — EVALUATION (OUTPATIENT)
Dept: PHYSICAL THERAPY | Facility: CLINIC | Age: 55
End: 2025-01-15
Payer: COMMERCIAL

## 2025-01-15 DIAGNOSIS — M25.562 LEFT KNEE PAIN, UNSPECIFIED CHRONICITY: ICD-10-CM

## 2025-01-15 DIAGNOSIS — M47.27 LUMBOSACRAL RADICULOPATHY DUE TO DEGENERATIVE JOINT DISEASE OF SPINE: ICD-10-CM

## 2025-01-15 DIAGNOSIS — M25.552 LEFT HIP PAIN: ICD-10-CM

## 2025-01-15 DIAGNOSIS — S83.92XA SPRAIN OF LEFT KNEE, INITIAL ENCOUNTER: ICD-10-CM

## 2025-01-15 DIAGNOSIS — S76.012A HIP STRAIN, LEFT, INITIAL ENCOUNTER: ICD-10-CM

## 2025-01-15 PROCEDURE — 97110 THERAPEUTIC EXERCISES: CPT | Mod: GP | Performed by: PHYSICAL THERAPIST

## 2025-01-15 PROCEDURE — 97161 PT EVAL LOW COMPLEX 20 MIN: CPT | Mod: GP | Performed by: PHYSICAL THERAPIST

## 2025-01-15 NOTE — PROGRESS NOTES
Physical Therapy Evaluation and Treatment     Patient Name: Buzz Shu  MRN: [id number]  Encounter date: 1/15/2025  Time Calculation  Start Time: 130  Stop Time: 215  Time Calculation (min): 45 min  PT Evaluation Time Entry  PT Evaluation (Low) Time Entry: 20  PT Therapeutic Procedures Time Entry  Therapeutic Exercise Time Entry: 24    Visit # 1 of 12  Visits/Dates Authorized: 2025: NO AUTH, 50.00 CO PAY, 100% COVERAGE, 20V PT, [facility]     Current Problem:   Problem List Items Addressed This Visit             ICD-10-CM    Left knee pain M25.562    Relevant Orders    Follow Up In Physical Therapy    Left hip pain M25.552    Relevant Orders    Follow Up In Physical Therapy    Hip strain, left, initial encounter S76.012A    Relevant Orders    Follow Up In Physical Therapy    Sprain of left knee, initial encounter S83.92XA    Relevant Orders    Follow Up In Physical Therapy    Lumbosacral radiculopathy due to degenerative joint disease of spine M47.27    Relevant Orders    Follow Up In Physical Therapy     Precautions:          Subjective Evaluation    History of Present Illness  Mechanism of injury: Primary complaint is L knee initially, then R knee numbness and weakness    Has hx of L Leg longer, surgery on L knee to close growth plate as a child.       Prolonged walk of about 45-60 minutes seems to bring on the numbness.  He describes that walking at the store - slow, meandering walks, interspersed with standing is what will bring it on. However, going for a hike , and  walking all day at work does not seem to cause the problem.     Quality of life: excellent    Pain  Current pain ratin  At best pain ratin  At worst pain ratin  Location: L LBP  Quality: pressure  Alleviating factors: sitting, more continuos moving.  Exacerbated by: lsow walk, standing.  Progression: worsening    Diagnostic Tests  X-ray: abnormal (FINDINGS: No acute fracture or malalignment. B hip jt spaces well preserved. Adv appearing  deg changes lower L spine.Cam deformities of bilateral fem necks which can be associated with femoroacetabular impingement. Soft tissues are within normal limits.)    Patient Goals  Patient goals for therapy: increased strength, decreased pain, improved balance and increased motion  Patient goal: be able to walk without symptoms      Objective     Static Posture   General Observations  Right leg length discrepancy, shifted left and scoliosis.     Thoracic Spine  Rotated right.    Lumbar Spine   Lumbar spine (Left): Tilted.     Comments  Alignment and leg length assessment  Standing    Iliac crest L higher  Supine medial malleolus L lower  ASIS =  Long sitting negative  Assessment L leg is long       Vitals:       Objective     Static Posture   General Observations  Right leg length discrepancy, shifted left and scoliosis.     Thoracic Spine  Rotated right.    Lumbar Spine   Lumbar spine (Left): Tilted.     Comments  Alignment and leg length assessment  Standing    Iliac crest L higher  Supine medial malleolus L lower  ASIS =  Long sitting negative  Assessment L leg is long       Objective     Static Posture   General Observations  Right leg length discrepancy, shifted left and scoliosis.     Thoracic Spine  Rotated right.    Lumbar Spine   Lumbar spine (Left): Tilted.     Comments  Alignment and leg length assessment  Standing    Iliac crest L higher  Supine medial malleolus L lower  ASIS =  Long sitting negative  Assessment L leg is long            Single leg: Firm Eyes Open L 30+sec, R 5  sec       Treatments:     Therapeutic Exercise 29555:     HEP / Access Codes:   Access Code: [email or web address]  URL: [email or web address]  Date: 01/15/2025  Prepared by: Gregory Lewis    Exercises  - Hooklying Isometric Hip Flexion  - 1 x daily - 3-4 x weekly - 2 sets - 10 reps  - Supine Hip Adduction Isometric with Ball  - 1 x daily - 3-4 x weekly - 2 sets - 10 reps  - Hooklying Clamshell with Resistance  - 1 x daily - 3-4 x weekly  - 2 sets - 10 reps  - Beginner Bridge  - 1 x daily - 3-4 x weekly - 2 sets - 10 reps  - Supine Lower Trunk Rotation  - 4 x daily - 7 x weekly - 2 sets - 10 reps  - Hooklying Single Knee to Chest Stretch  - 4 x daily - 7 x weekly - 2 sets - 10 reps  - Supine Sciatic Nerve Glide  - 4 x daily - 7 x weekly - 3 sets - 10 reps    Assessment & Plan     Assessment  Impairments: abnormal gait, abnormal muscle firing, abnormal or restricted ROM, activity intolerance, impaired balance, impaired physical strength, lacks appropriate home exercise program and pain with function  Assessment details: L leg is longer and more dominant. He tends to WB and use L LE and L side in general more. R side is weaker/ has poor 1 leg balance.  As he stands or goes through the store he relies on L side more, resulting in more L Lspine SB. L knee numbness is result.     Key impairments include: decreased ROM and strength, poor balance and compensatory gait patterning, increased swelling, and pain with functional activities such as squatting, walking, and stairs. Patient would benefit from skilled physical therapy services to address these impairments and restore normal ROM and strength to reduce pain and improving activity participation.    Prognosis: good    Goals  be able to walk without symptoms    Plan  Therapy options: will be seen for skilled physical therapy services  Planned modality interventions: cryotherapy, TENS and thermotherapy (hydrocollator packs)  Other planned modality interventions: DN and IASTM, heel lift for R  Planned therapy interventions: abdominal trunk stabilization, manual therapy, balance/weight-bearing training, neuromuscular re-education, body mechanics training, postural training, soft tissue mobilization, strengthening, stretching, therapeutic activities, home exercise program, functional ROM exercises and flexibility  Other planned therapy interventions: R LE strength and balance, core stabalization,  Frequency:  2x week  Duration in weeks: 10  Treatment plan discussed with: patient  Plan details: Improve R LE strength, decreasing L side dominance. Core stability. Lift for L LE       Low complexity due to patient's clinical presentation being stable and uncomplicated by any significant comorbidities that may affect rehab tolerance and progression.     Post-Treatment Symptoms:       Goals:   Active       PT Problem       PT Goal 1       Start:  01/16/25    Expected End:  04/15/25           Patient to demonstrate the use of proper body mechanics and posture when performing ADL's and picking up objects > 10 lbs to eliminate/reduce their symptoms for self care independence.    Patient to sleep uninterrupted for 6 hours without being awakened by their pain to allow them to perform ADL's and work effectively during the day.     Decrease patient's subjective low back pain to 1/10.     Patient will be able to stand > 30 minutes without increased pain to allow patient to prepare meals for self care independence.    Patient to rotate trunk to the left to look over shoulder when driving to safely switch lanes and back up an automobile.    Patient to be able to load and unload their  without increased pain in their low back while demonstrating proper body mechanics for self care independence.    Patient will improve their Low Back Disability Score to < 20% to allow patient to carry groceries into home without symptom exacerbation.     Patient will demonstrate painfree lumbar AROM in all planes in order improve mobility and ADL's.     Patient will demonstrate sit to stand transfer without UE support to improve functional mobility.     Patient will demonstrate proper lifting and squatting mechanics to protect spine with work related activity.

## 2025-01-16 SDOH — ECONOMIC STABILITY: GENERAL: QUALITY OF LIFE: EXCELLENT

## 2025-01-16 ASSESSMENT — ENCOUNTER SYMPTOMS
PAIN SCALE AT HIGHEST: 6
QUALITY: PRESSURE
PAIN SCALE: 2
PAIN SCALE AT LOWEST: 0
PAIN LOCATION: L LBP

## 2025-01-16 ASSESSMENT — ACTIVITIES OF DAILY LIVING (ADL): POOR_BALANCE: 1

## 2025-01-29 ENCOUNTER — APPOINTMENT (OUTPATIENT)
Dept: PHYSICAL THERAPY | Facility: CLINIC | Age: 55
End: 2025-01-29
Payer: COMMERCIAL

## 2025-02-05 ENCOUNTER — APPOINTMENT (OUTPATIENT)
Dept: PHYSICAL THERAPY | Facility: CLINIC | Age: 55
End: 2025-02-05
Payer: COMMERCIAL

## 2025-02-12 ENCOUNTER — APPOINTMENT (OUTPATIENT)
Dept: PHYSICAL THERAPY | Facility: CLINIC | Age: 55
End: 2025-02-12
Payer: COMMERCIAL

## 2025-02-19 ENCOUNTER — APPOINTMENT (OUTPATIENT)
Dept: PHYSICAL THERAPY | Facility: CLINIC | Age: 55
End: 2025-02-19
Payer: COMMERCIAL

## 2025-02-19 DIAGNOSIS — E78.00 HYPERCHOLESTEREMIA: ICD-10-CM

## 2025-02-19 RX ORDER — ROSUVASTATIN CALCIUM 20 MG/1
20 TABLET, COATED ORAL DAILY
Qty: 90 TABLET | Refills: 1 | Status: SHIPPED | OUTPATIENT
Start: 2025-02-19

## 2025-02-23 DIAGNOSIS — E03.9 HYPOTHYROIDISM, UNSPECIFIED TYPE: ICD-10-CM

## 2025-02-24 RX ORDER — LEVOTHYROXINE SODIUM 25 UG/1
25 TABLET ORAL DAILY
Qty: 90 TABLET | Refills: 0 | Status: SHIPPED | OUTPATIENT
Start: 2025-02-24

## 2025-02-24 NOTE — TELEPHONE ENCOUNTER
Last seen 11/18/2024  No scheduled appointment  Thyroid labs completed 11/26/2024  TSH 6.71, T4 Free 1.01

## 2025-02-26 ENCOUNTER — APPOINTMENT (OUTPATIENT)
Dept: PHYSICAL THERAPY | Facility: CLINIC | Age: 55
End: 2025-02-26
Payer: COMMERCIAL

## 2025-03-05 ENCOUNTER — APPOINTMENT (OUTPATIENT)
Dept: PHYSICAL THERAPY | Facility: CLINIC | Age: 55
End: 2025-03-05
Payer: COMMERCIAL

## 2025-03-12 ENCOUNTER — APPOINTMENT (OUTPATIENT)
Dept: PHYSICAL THERAPY | Facility: CLINIC | Age: 55
End: 2025-03-12
Payer: COMMERCIAL

## 2025-03-19 ENCOUNTER — APPOINTMENT (OUTPATIENT)
Dept: PHYSICAL THERAPY | Facility: CLINIC | Age: 55
End: 2025-03-19
Payer: COMMERCIAL

## 2025-03-31 ENCOUNTER — OFFICE VISIT (OUTPATIENT)
Dept: URGENT CARE | Age: 55
End: 2025-03-31
Payer: COMMERCIAL

## 2025-03-31 ENCOUNTER — ANCILLARY PROCEDURE (OUTPATIENT)
Dept: URGENT CARE | Age: 55
End: 2025-03-31
Payer: COMMERCIAL

## 2025-03-31 VITALS
HEART RATE: 65 BPM | WEIGHT: 260 LBS | RESPIRATION RATE: 20 BRPM | TEMPERATURE: 98 F | DIASTOLIC BLOOD PRESSURE: 86 MMHG | OXYGEN SATURATION: 97 % | BODY MASS INDEX: 33.38 KG/M2 | SYSTOLIC BLOOD PRESSURE: 135 MMHG

## 2025-03-31 DIAGNOSIS — M25.522 LEFT ELBOW PAIN: ICD-10-CM

## 2025-03-31 PROCEDURE — 73080 X-RAY EXAM OF ELBOW: CPT | Mod: LEFT SIDE

## 2025-03-31 PROCEDURE — 99203 OFFICE O/P NEW LOW 30 MIN: CPT

## 2025-03-31 ASSESSMENT — ENCOUNTER SYMPTOMS: ARTHRALGIAS: 1

## 2025-03-31 NOTE — PROGRESS NOTES
Subjective   Patient ID: Buzz Suh is a 54 y.o. male. They present today with a chief complaint of Elbow Pain (Pt c/o lt elbow pain with movement, had surgery on it 6-8 years ago had plastic placed on it).    History of Present Illness  HPI    Past Medical History  Allergies as of 03/31/2025    (No Known Allergies)       (Not in a hospital admission)       Past Medical History:   Diagnosis Date    Acute bronchitis 06/06/2023    Acute gout of right ankle 06/06/2023    Acute maxillary sinusitis 06/06/2023    Ankle injury 06/06/2023    Arthritis of right ankle 06/06/2023    Elbow pain 06/06/2023    Foot injury 06/06/2023    Hypothyroidism     Olecranon bursitis of left elbow 06/06/2023    Post surgical complication 06/06/2023    Puncture wound 06/06/2023    Radial head fracture 06/06/2023    Right ankle pain 06/06/2023    Strain of right triceps 06/06/2023    Triceps tendinitis 06/06/2023       Past Surgical History:   Procedure Laterality Date    ELBOW SURGERY  07/30/2018    Elbow Surgery    KNEE SURGERY  07/30/2018    Knee Surgery    OTHER SURGICAL HISTORY  07/30/2018    Elbow Arthroplasty Of Radial Head        reports that he has been smoking cigarettes. He has never used smokeless tobacco. He reports current alcohol use. He reports that he does not use drugs.    Review of Systems  Review of Systems   Musculoskeletal:  Positive for arthralgias.                                  Objective    Vitals:    03/31/25 1755   BP: 135/86   BP Location: Left arm   Patient Position: Sitting   BP Cuff Size: Large adult   Pulse: 65   Resp: 20   Temp: 36.7 °C (98 °F)   TempSrc: Oral   SpO2: 97%   Weight: 118 kg (260 lb)     No LMP for male patient.    Physical Exam  Vitals reviewed.   Constitutional:       Appearance: Normal appearance.   HENT:      Head: Normocephalic and atraumatic.   Cardiovascular:      Pulses: Normal pulses.      Heart sounds: Normal heart sounds.   Pulmonary:      Effort: Pulmonary effort is normal.       Breath sounds: Normal breath sounds.   Musculoskeletal:         General: Tenderness present.   Skin:     General: Skin is warm.      Capillary Refill: Capillary refill takes less than 2 seconds.   Neurological:      General: No focal deficit present.      Mental Status: He is alert and oriented to person, place, and time.   Psychiatric:         Mood and Affect: Mood normal.         Behavior: Behavior normal.         Procedures    Point of Care Test & Imaging Results from this visit  No results found for this visit on 03/31/25.   Imaging  No results found.    Cardiology, Vascular, and Other Imaging  No other imaging results found for the past 2 days      Diagnostic study results (if any) were reviewed by JING Cummins.    Assessment/Plan   Allergies, medications, history, and pertinent labs/EKGs/Imaging reviewed by JING Cummins.     Medical Decision Making  54-year-old male presents for left elbow pain patient reports that he is having extensive surgery on his left elbow was working on his boat and developed elbow pain denies injury.  On exam patient in no acute distress vital signs are stable heart rate is regular lungs are clear bilaterally there is no edema no erythema no ecchymosis pulses are palpable patient does have full range of motion left elbow x-ray read by radiology shows no acute osseous abnormality is identified in the left elbow some degenerative changes with joint space loss and osteophytosis.  Patient is follow-up with orthopedics if symptoms persist take Tylenol as needed for pain Go to the emergency department with any worsening symptoms patient agrees with plan of care patient left in stable condition    Orders and Diagnoses  There are no diagnoses linked to this encounter.    Medical Admin Record      Patient disposition: Home    Electronically signed by JING Cummins  6:16 PM

## 2025-05-13 ENCOUNTER — APPOINTMENT (OUTPATIENT)
Dept: PRIMARY CARE | Facility: CLINIC | Age: 55
End: 2025-05-13
Payer: COMMERCIAL

## 2025-07-11 ENCOUNTER — APPOINTMENT (OUTPATIENT)
Dept: PRIMARY CARE | Facility: CLINIC | Age: 55
End: 2025-07-11
Payer: COMMERCIAL

## 2025-07-11 VITALS
DIASTOLIC BLOOD PRESSURE: 79 MMHG | BODY MASS INDEX: 34.78 KG/M2 | RESPIRATION RATE: 18 BRPM | SYSTOLIC BLOOD PRESSURE: 119 MMHG | HEART RATE: 56 BPM | WEIGHT: 271 LBS | HEIGHT: 74 IN | OXYGEN SATURATION: 95 %

## 2025-07-11 DIAGNOSIS — Z87.891 FORMER CIGARETTE SMOKER: ICD-10-CM

## 2025-07-11 DIAGNOSIS — E03.9 HYPOTHYROIDISM, UNSPECIFIED TYPE: ICD-10-CM

## 2025-07-11 DIAGNOSIS — Z00.00 HEALTH CARE MAINTENANCE: ICD-10-CM

## 2025-07-11 DIAGNOSIS — E78.00 HYPERCHOLESTEREMIA: ICD-10-CM

## 2025-07-11 DIAGNOSIS — R91.1 LUNG NODULE: Primary | ICD-10-CM

## 2025-07-11 PROBLEM — J20.9 ACUTE EXACERBATION OF CHRONIC BRONCHITIS (MULTI): Status: RESOLVED | Noted: 2023-06-06 | Resolved: 2025-07-11

## 2025-07-11 PROBLEM — S02.85XA FRACTURE OF ORBIT (MULTI): Status: RESOLVED | Noted: 2024-11-14 | Resolved: 2025-07-11

## 2025-07-11 PROBLEM — J42 ACUTE EXACERBATION OF CHRONIC BRONCHITIS (MULTI): Status: RESOLVED | Noted: 2023-06-06 | Resolved: 2025-07-11

## 2025-07-11 PROCEDURE — 99213 OFFICE O/P EST LOW 20 MIN: CPT | Performed by: INTERNAL MEDICINE

## 2025-07-11 PROCEDURE — 3008F BODY MASS INDEX DOCD: CPT | Performed by: INTERNAL MEDICINE

## 2025-07-11 ASSESSMENT — ENCOUNTER SYMPTOMS
CARDIOVASCULAR NEGATIVE: 1
RHINORRHEA: 1
CONSTITUTIONAL NEGATIVE: 1
ENDOCRINE NEGATIVE: 1
MUSCULOSKELETAL NEGATIVE: 1
RESPIRATORY NEGATIVE: 1
EYES NEGATIVE: 1
PSYCHIATRIC NEGATIVE: 1
GASTROINTESTINAL NEGATIVE: 1
NEUROLOGICAL NEGATIVE: 1

## 2025-07-11 ASSESSMENT — PAIN SCALES - GENERAL: PAINLEVEL_OUTOF10: 0-NO PAIN

## 2025-07-11 ASSESSMENT — PATIENT HEALTH QUESTIONNAIRE - PHQ9
1. LITTLE INTEREST OR PLEASURE IN DOING THINGS: NOT AT ALL
SUM OF ALL RESPONSES TO PHQ9 QUESTIONS 1 AND 2: 0
2. FEELING DOWN, DEPRESSED OR HOPELESS: NOT AT ALL

## 2025-07-11 NOTE — PATIENT INSTRUCTIONS
It was nice to see you in the office today!  As discussed during our visit...         You can try CoQ supplement to help with the muscle aches from the statin.  Follow up in 6 months or sooner if needed. We will order lab work at that time.

## 2025-07-11 NOTE — ASSESSMENT & PLAN NOTE
- Continue with levothyroxine  - reviewed last TSH and free T4  - Take levothyroxine on an empty stomach with water alone, 1 hour before eating or taking other medications, 4 hours before any calcium or iron supplement.   - Hold Biotin (all B vitamins, B Complex) for 2 days before any blood draw

## 2025-07-11 NOTE — PROGRESS NOTES
"Patient ID:   Buzz Suh is a 54 y.o. male with PMH remarkable for probable sleep apnea, HLD, tobacco dependence,  hypothyroidism, gout, murmur, who presents to the office today for Follow-up.    HEALTH MAINTENANCE: Follow Up  Last Office Visit: 11/28/2024 for annual physical appt. Endorsed his left knee would go numb when standing too long. Has urinary frequency. Had surgery on left knee in the past. Referred to ortho lab work was ordered as well as a CT lung low dose screen. Started on chantix to aide in quitting smoking.   Last Labs: 11/26/2024  HgbA1c 11/26/2024 5.9 (was 6.0 prior)  PSA Screening (starting at age 55 till 69): normal in 2023.  Colonoscopy (45-75 or age 40 with 1st degree relative dx colon ca): 11/30/2023 with Dr Funk with one polyp removed. Repeat in 10 yrs. DUE 2033.  Lung cancer screening (50-76 y/o x 20 pk yr for at least 20 yrs + current smoker OR quit in last 15 years, no CT w/I last year): 12/10/2024 noting 5mm RLL nodule. Rec repeat in 1 yr.    Reports that he did PT once but was unable to continue at that time.  Reports that the left knee pain is better.   Denies issues with bowel or bladder. Denies mood changes. No heat or cold intolerance.  Does feel like he is more sore than usual that he attributes to the statin. Discussed with him about trying CoQ10 supplement.  Will recheck labs in 6m when he returns for FU.     Social History[1]    Review of Systems   Constitutional: Negative.    HENT:  Positive for postnasal drip and rhinorrhea.    Eyes: Negative.    Respiratory: Negative.     Cardiovascular: Negative.    Gastrointestinal: Negative.    Endocrine: Negative.    Genitourinary: Negative.    Musculoskeletal: Negative.    Skin: Negative.    Neurological: Negative.    Psychiatric/Behavioral: Negative.     All other systems reviewed and are negative.    Visit Vitals  /79 (BP Location: Left arm, Patient Position: Sitting)   Pulse 56   Resp 18   Ht 1.88 m (6' 2\")   Wt 123 kg (271 lb) "   SpO2 95%   BMI 34.79 kg/m²   Smoking Status Former   BSA 2.53 m²     Physical Exam  Vitals reviewed. Exam conducted with a chaperone present.   Constitutional:       Appearance: Normal appearance. He is well-developed.   HENT:      Head: Normocephalic.      Right Ear: External ear normal.      Left Ear: External ear normal.      Nose: Rhinorrhea present.      Mouth/Throat:      Lips: Pink.      Mouth: Mucous membranes are moist.   Eyes:      General: Lids are normal.      Pupils: Pupils are equal, round, and reactive to light.   Neck:      Trachea: Trachea normal.   Cardiovascular:      Rate and Rhythm: Normal rate and regular rhythm.      Heart sounds: Murmur heard.      Comments: Slight faint murmur  Pulmonary:      Effort: Pulmonary effort is normal.      Breath sounds: Normal breath sounds.   Abdominal:      General: Bowel sounds are normal.      Palpations: Abdomen is soft.   Skin:     General: Skin is warm and moist.   Neurological:      General: No focal deficit present.      Mental Status: He is alert and oriented to person, place, and time. Mental status is at baseline.   Psychiatric:         Attention and Perception: Attention normal.         Mood and Affect: Mood normal.         Speech: Speech normal.         Behavior: Behavior is cooperative.         Thought Content: Thought content normal.         Cognition and Memory: Cognition normal.         Judgment: Judgment normal.       Current Outpatient Medications   Medication Instructions    levothyroxine (SYNTHROID, LEVOXYL) 25 mcg, oral, Daily    rosuvastatin (CRESTOR) 20 mg, oral, Daily      Lab Results   Component Value Date    WBC 6.0 11/26/2024    HGB 15.2 11/26/2024    HCT 46.2 11/26/2024     11/26/2024    CHOL 197 11/26/2024    TRIG 229 (H) 11/26/2024    HDL 37.5 11/26/2024    ALT 23 11/26/2024    AST 24 11/26/2024     11/26/2024    K 4.8 11/26/2024     11/26/2024    CREATININE 1.30 11/26/2024    BUN 19 11/26/2024    CO2 29  2024    TSH 6.71 (H) 2024    INR 1.0 2022    HGBA1C 5.9 (H) 2024       Problem List Items Addressed This Visit           ICD-10-CM    Health care maintenance Z00.00    -  Preventive screening / Vaccination(s) reviewed and discussed  -  Recommended regular aerobic exercise and proper diet.  -  Discussed need and benefit for weight management  -  Medications were reviewed, list was updated, and refills given if needed.         Hypothyroidism E03.9    - Continue with levothyroxine  - reviewed last TSH and free T4  - Take levothyroxine on an empty stomach with water alone, 1 hour before eating or taking other medications, 4 hours before any calcium or iron supplement.   - Hold Biotin (all B vitamins, B Complex) for 2 days before any blood draw         Hypercholesteremia E78.00    - c/w statin          CONGRATULATIONS ON QUITTING SMOKING!  --------------------  Written by Pattie Lira RN, acting as a scribe for Dr. Andrade. This note accurately reflects the work and decisions made by Dr. Andrade.     I, Dr. Andrade, attest all medical record entries made by the scribe were under my direction and were personally dictated by me. I have reviewed the chart and agree that the record accurately reflects my performance of the history, physical exam, and assessment and plan.          [1]   Social History  Tobacco Use    Smoking status: Former     Current packs/day: 0.00     Average packs/day: 2.0 packs/day for 38.2 years (76.3 ttl pk-yrs)     Types: Cigarettes     Start date:      Quit date: 3/1/2025     Years since quittin.3    Smokeless tobacco: Never   Vaping Use    Vaping status: Never Used   Substance Use Topics    Alcohol use: Yes    Drug use: Never